# Patient Record
Sex: MALE | Race: WHITE | Employment: FULL TIME | URBAN - METROPOLITAN AREA
[De-identification: names, ages, dates, MRNs, and addresses within clinical notes are randomized per-mention and may not be internally consistent; named-entity substitution may affect disease eponyms.]

---

## 2018-06-15 ENCOUNTER — OFFICE VISIT (OUTPATIENT)
Dept: ORTHOPEDIC SURGERY | Age: 63
End: 2018-06-15

## 2018-06-15 VITALS
SYSTOLIC BLOOD PRESSURE: 131 MMHG | DIASTOLIC BLOOD PRESSURE: 84 MMHG | HEART RATE: 60 BPM | HEIGHT: 74 IN | WEIGHT: 245 LBS | BODY MASS INDEX: 31.44 KG/M2

## 2018-06-15 DIAGNOSIS — S83.242A TEAR OF MEDIAL MENISCUS OF LEFT KNEE, CURRENT, UNSPECIFIED TEAR TYPE, INITIAL ENCOUNTER: Primary | ICD-10-CM

## 2018-06-15 PROCEDURE — 99203 OFFICE O/P NEW LOW 30 MIN: CPT | Performed by: ORTHOPAEDIC SURGERY

## 2018-06-15 PROCEDURE — G8427 DOCREV CUR MEDS BY ELIG CLIN: HCPCS | Performed by: ORTHOPAEDIC SURGERY

## 2018-06-15 PROCEDURE — 1036F TOBACCO NON-USER: CPT | Performed by: ORTHOPAEDIC SURGERY

## 2018-06-15 PROCEDURE — 3017F COLORECTAL CA SCREEN DOC REV: CPT | Performed by: ORTHOPAEDIC SURGERY

## 2018-06-15 PROCEDURE — G8417 CALC BMI ABV UP PARAM F/U: HCPCS | Performed by: ORTHOPAEDIC SURGERY

## 2018-06-19 ENCOUNTER — HOSPITAL ENCOUNTER (OUTPATIENT)
Dept: MRI IMAGING | Age: 63
Discharge: HOME | End: 2018-06-19
Attending: ORTHOPAEDIC SURGERY | Admitting: ORTHOPAEDIC SURGERY

## 2018-06-19 DIAGNOSIS — S83.242A TEAR OF MEDIAL MENISCUS OF LEFT KNEE, CURRENT, UNSPECIFIED TEAR TYPE, INITIAL ENCOUNTER: ICD-10-CM

## 2018-06-19 DIAGNOSIS — S83.242A OTHER TEAR OF MEDIAL MENISCUS, CURRENT INJURY, LEFT KNEE, INITIAL ENCOUNTER: ICD-10-CM

## 2018-09-05 ENCOUNTER — TELEPHONE (OUTPATIENT)
Dept: ORTHOPEDIC SURGERY | Age: 63
End: 2018-09-05

## 2018-09-05 NOTE — TELEPHONE ENCOUNTER
Auth: # [id number]    Date: 9/25/18   Type of SX:  Outpatient  Location: [facility]  CPT: 40285, 21438   SX area:  knee

## 2018-09-25 ENCOUNTER — HOSPITAL ENCOUNTER (OUTPATIENT)
Age: 63
Setting detail: OUTPATIENT SURGERY
Discharge: HOME | End: 2018-09-25
Attending: ORTHOPAEDIC SURGERY | Admitting: ORTHOPAEDIC SURGERY
Payer: COMMERCIAL

## 2018-09-25 ENCOUNTER — ANESTHESIA EVENT (OUTPATIENT)
Dept: OPERATING ROOM | Age: 63
End: 2018-09-25
Payer: COMMERCIAL

## 2018-09-25 ENCOUNTER — ANESTHESIA (OUTPATIENT)
Dept: OPERATING ROOM | Age: 63
End: 2018-09-25
Payer: COMMERCIAL

## 2018-09-25 VITALS
OXYGEN SATURATION: 94 % | RESPIRATION RATE: 19 BRPM | HEART RATE: 60 BPM | WEIGHT: 239.2 LBS | BODY MASS INDEX: 30.7 KG/M2 | SYSTOLIC BLOOD PRESSURE: 120 MMHG | DIASTOLIC BLOOD PRESSURE: 72 MMHG | TEMPERATURE: 98.4 F | HEIGHT: 74 IN

## 2018-09-25 VITALS
RESPIRATION RATE: 1 BRPM | DIASTOLIC BLOOD PRESSURE: 58 MMHG | OXYGEN SATURATION: 98 % | TEMPERATURE: 97 F | SYSTOLIC BLOOD PRESSURE: 108 MMHG

## 2018-09-25 PROCEDURE — 6370000000 HC RX 637 (ALT 250 FOR IP): Performed by: ANESTHESIOLOGY

## 2018-09-25 PROCEDURE — 6360000002 HC RX W HCPCS: Performed by: ORTHOPAEDIC SURGERY

## 2018-09-25 PROCEDURE — 6360000002 HC RX W HCPCS: Performed by: NURSE ANESTHETIST, CERTIFIED REGISTERED

## 2018-09-25 PROCEDURE — 3700000001 HC ADD 15 MINUTES (ANESTHESIA): Performed by: ORTHOPAEDIC SURGERY

## 2018-09-25 PROCEDURE — 3700000000 HC ANESTHESIA ATTENDED CARE: Performed by: ORTHOPAEDIC SURGERY

## 2018-09-25 PROCEDURE — 2720000010 HC SURG SUPPLY STERILE: Performed by: ORTHOPAEDIC SURGERY

## 2018-09-25 PROCEDURE — 7100000001 HC PACU RECOVERY - ADDTL 15 MIN: Performed by: ORTHOPAEDIC SURGERY

## 2018-09-25 PROCEDURE — 3600000014 HC SURGERY LEVEL 4 ADDTL 15MIN: Performed by: ORTHOPAEDIC SURGERY

## 2018-09-25 PROCEDURE — 7100000000 HC PACU RECOVERY - FIRST 15 MIN: Performed by: ORTHOPAEDIC SURGERY

## 2018-09-25 PROCEDURE — 3600000004 HC SURGERY LEVEL 4 BASE: Performed by: ORTHOPAEDIC SURGERY

## 2018-09-25 PROCEDURE — 2580000003 HC RX 258: Performed by: NURSE ANESTHETIST, CERTIFIED REGISTERED

## 2018-09-25 PROCEDURE — 2580000003 HC RX 258: Performed by: ORTHOPAEDIC SURGERY

## 2018-09-25 PROCEDURE — 2500000003 HC RX 250 WO HCPCS: Performed by: ORTHOPAEDIC SURGERY

## 2018-09-25 PROCEDURE — 6360000002 HC RX W HCPCS: Performed by: ANESTHESIOLOGY

## 2018-09-25 PROCEDURE — 2709999900 HC NON-CHARGEABLE SUPPLY: Performed by: ORTHOPAEDIC SURGERY

## 2018-09-25 PROCEDURE — 7100000011 HC PHASE II RECOVERY - ADDTL 15 MIN: Performed by: ORTHOPAEDIC SURGERY

## 2018-09-25 PROCEDURE — 2500000003 HC RX 250 WO HCPCS: Performed by: NURSE ANESTHETIST, CERTIFIED REGISTERED

## 2018-09-25 PROCEDURE — 7100000010 HC PHASE II RECOVERY - FIRST 15 MIN: Performed by: ORTHOPAEDIC SURGERY

## 2018-09-25 RX ORDER — SODIUM CHLORIDE, SODIUM LACTATE, POTASSIUM CHLORIDE, CALCIUM CHLORIDE 600; 310; 30; 20 MG/100ML; MG/100ML; MG/100ML; MG/100ML
INJECTION, SOLUTION INTRAVENOUS CONTINUOUS
Status: DISCONTINUED | OUTPATIENT
Start: 2018-09-25 | End: 2018-09-25 | Stop reason: HOSPADM

## 2018-09-25 RX ORDER — LIDOCAINE HYDROCHLORIDE 20 MG/ML
INJECTION, SOLUTION INFILTRATION; PERINEURAL PRN
Status: DISCONTINUED | OUTPATIENT
Start: 2018-09-25 | End: 2018-09-25 | Stop reason: SDUPTHER

## 2018-09-25 RX ORDER — MAGNESIUM HYDROXIDE 1200 MG/15ML
LIQUID ORAL CONTINUOUS PRN
Status: DISCONTINUED | OUTPATIENT
Start: 2018-09-25 | End: 2018-09-25 | Stop reason: HOSPADM

## 2018-09-25 RX ORDER — MEPERIDINE HYDROCHLORIDE 25 MG/ML
12.5 INJECTION INTRAMUSCULAR; INTRAVENOUS; SUBCUTANEOUS EVERY 5 MIN PRN
Status: DISCONTINUED | OUTPATIENT
Start: 2018-09-25 | End: 2018-09-25 | Stop reason: HOSPADM

## 2018-09-25 RX ORDER — PROPOFOL 10 MG/ML
INJECTION, EMULSION INTRAVENOUS PRN
Status: DISCONTINUED | OUTPATIENT
Start: 2018-09-25 | End: 2018-09-25 | Stop reason: SDUPTHER

## 2018-09-25 RX ORDER — LABETALOL HYDROCHLORIDE 5 MG/ML
5 INJECTION, SOLUTION INTRAVENOUS EVERY 10 MIN PRN
Status: DISCONTINUED | OUTPATIENT
Start: 2018-09-25 | End: 2018-09-25 | Stop reason: HOSPADM

## 2018-09-25 RX ORDER — MORPHINE SULFATE 10 MG/ML
1 INJECTION, SOLUTION INTRAMUSCULAR; INTRAVENOUS EVERY 5 MIN PRN
Status: DISCONTINUED | OUTPATIENT
Start: 2018-09-25 | End: 2018-09-25 | Stop reason: HOSPADM

## 2018-09-25 RX ORDER — ONDANSETRON 2 MG/ML
4 INJECTION INTRAMUSCULAR; INTRAVENOUS
Status: DISCONTINUED | OUTPATIENT
Start: 2018-09-25 | End: 2018-09-25 | Stop reason: HOSPADM

## 2018-09-25 RX ORDER — DIPHENHYDRAMINE HYDROCHLORIDE 50 MG/ML
12.5 INJECTION INTRAMUSCULAR; INTRAVENOUS
Status: DISCONTINUED | OUTPATIENT
Start: 2018-09-25 | End: 2018-09-25 | Stop reason: HOSPADM

## 2018-09-25 RX ORDER — FENTANYL CITRATE 50 UG/ML
25 INJECTION, SOLUTION INTRAMUSCULAR; INTRAVENOUS EVERY 5 MIN PRN
Status: DISCONTINUED | OUTPATIENT
Start: 2018-09-25 | End: 2018-09-25 | Stop reason: HOSPADM

## 2018-09-25 RX ORDER — DEXAMETHASONE SODIUM PHOSPHATE 4 MG/ML
INJECTION, SOLUTION INTRA-ARTICULAR; INTRALESIONAL; INTRAMUSCULAR; INTRAVENOUS; SOFT TISSUE PRN
Status: DISCONTINUED | OUTPATIENT
Start: 2018-09-25 | End: 2018-09-25 | Stop reason: SDUPTHER

## 2018-09-25 RX ORDER — LIDOCAINE HYDROCHLORIDE 10 MG/ML
0.5 INJECTION, SOLUTION EPIDURAL; INFILTRATION; INTRACAUDAL; PERINEURAL ONCE
Status: COMPLETED | OUTPATIENT
Start: 2018-09-25 | End: 2018-09-25

## 2018-09-25 RX ORDER — HYDROMORPHONE HCL 110MG/55ML
0.5 PATIENT CONTROLLED ANALGESIA SYRINGE INTRAVENOUS EVERY 5 MIN PRN
Status: DISCONTINUED | OUTPATIENT
Start: 2018-09-25 | End: 2018-09-25 | Stop reason: HOSPADM

## 2018-09-25 RX ORDER — SODIUM CHLORIDE, SODIUM LACTATE, POTASSIUM CHLORIDE, CALCIUM CHLORIDE 600; 310; 30; 20 MG/100ML; MG/100ML; MG/100ML; MG/100ML
INJECTION, SOLUTION INTRAVENOUS CONTINUOUS PRN
Status: DISCONTINUED | OUTPATIENT
Start: 2018-09-25 | End: 2018-09-25 | Stop reason: SDUPTHER

## 2018-09-25 RX ORDER — ROPIVACAINE HYDROCHLORIDE 5 MG/ML
INJECTION, SOLUTION EPIDURAL; INFILTRATION; PERINEURAL
Status: COMPLETED | OUTPATIENT
Start: 2018-09-25 | End: 2018-09-25

## 2018-09-25 RX ORDER — CEFAZOLIN SODIUM 2 G/100ML
2 INJECTION, SOLUTION INTRAVENOUS
Status: COMPLETED | OUTPATIENT
Start: 2018-09-25 | End: 2018-09-25

## 2018-09-25 RX ORDER — ONDANSETRON 2 MG/ML
INJECTION INTRAMUSCULAR; INTRAVENOUS PRN
Status: DISCONTINUED | OUTPATIENT
Start: 2018-09-25 | End: 2018-09-25 | Stop reason: SDUPTHER

## 2018-09-25 RX ORDER — FENTANYL CITRATE 50 UG/ML
INJECTION, SOLUTION INTRAMUSCULAR; INTRAVENOUS PRN
Status: DISCONTINUED | OUTPATIENT
Start: 2018-09-25 | End: 2018-09-25 | Stop reason: SDUPTHER

## 2018-09-25 RX ORDER — DEXTROSE MONOHYDRATE 50 MG/ML
INJECTION, SOLUTION INTRAVENOUS CONTINUOUS PRN
Status: DISCONTINUED | OUTPATIENT
Start: 2018-09-25 | End: 2018-09-25 | Stop reason: HOSPADM

## 2018-09-25 RX ORDER — OXYCODONE HYDROCHLORIDE AND ACETAMINOPHEN 5; 325 MG/1; MG/1
2 TABLET ORAL PRN
Status: COMPLETED | OUTPATIENT
Start: 2018-09-25 | End: 2018-09-25

## 2018-09-25 RX ORDER — MORPHINE SULFATE 10 MG/ML
INJECTION, SOLUTION INTRAMUSCULAR; INTRAVENOUS
Status: COMPLETED | OUTPATIENT
Start: 2018-09-25 | End: 2018-09-25

## 2018-09-25 RX ORDER — OXYCODONE HYDROCHLORIDE AND ACETAMINOPHEN 5; 325 MG/1; MG/1
1 TABLET ORAL PRN
Status: COMPLETED | OUTPATIENT
Start: 2018-09-25 | End: 2018-09-25

## 2018-09-25 RX ADMIN — SODIUM CHLORIDE, POTASSIUM CHLORIDE, SODIUM LACTATE AND CALCIUM CHLORIDE: 600; 310; 30; 20 INJECTION, SOLUTION INTRAVENOUS at 11:57

## 2018-09-25 RX ADMIN — SODIUM CHLORIDE, POTASSIUM CHLORIDE, SODIUM LACTATE AND CALCIUM CHLORIDE: 600; 310; 30; 20 INJECTION, SOLUTION INTRAVENOUS at 12:00

## 2018-09-25 RX ADMIN — PROPOFOL 150 MG: 10 INJECTION, EMULSION INTRAVENOUS at 12:02

## 2018-09-25 RX ADMIN — CEFAZOLIN SODIUM 2 G: 2 INJECTION, SOLUTION INTRAVENOUS at 11:52

## 2018-09-25 RX ADMIN — SODIUM CHLORIDE, POTASSIUM CHLORIDE, SODIUM LACTATE AND CALCIUM CHLORIDE: 600; 310; 30; 20 INJECTION, SOLUTION INTRAVENOUS at 12:44

## 2018-09-25 RX ADMIN — OXYCODONE HYDROCHLORIDE AND ACETAMINOPHEN 1 TABLET: 5; 325 TABLET ORAL at 14:06

## 2018-09-25 RX ADMIN — FENTANYL CITRATE 50 MCG: 50 INJECTION, SOLUTION INTRAMUSCULAR; INTRAVENOUS at 12:19

## 2018-09-25 RX ADMIN — LIDOCAINE HYDROCHLORIDE 100 MG: 20 INJECTION, SOLUTION INFILTRATION; PERINEURAL at 12:00

## 2018-09-25 RX ADMIN — FENTANYL CITRATE 100 MCG: 50 INJECTION, SOLUTION INTRAMUSCULAR; INTRAVENOUS at 12:00

## 2018-09-25 RX ADMIN — DEXAMETHASONE SODIUM PHOSPHATE 8 MG: 4 INJECTION, SOLUTION INTRAMUSCULAR; INTRAVENOUS at 12:21

## 2018-09-25 RX ADMIN — HYDROMORPHONE HYDROCHLORIDE 0.5 MG: 2 INJECTION INTRAMUSCULAR; INTRAVENOUS; SUBCUTANEOUS at 13:47

## 2018-09-25 RX ADMIN — FENTANYL CITRATE 100 MCG: 50 INJECTION, SOLUTION INTRAMUSCULAR; INTRAVENOUS at 13:24

## 2018-09-25 RX ADMIN — SODIUM CHLORIDE, POTASSIUM CHLORIDE, SODIUM LACTATE AND CALCIUM CHLORIDE: 600; 310; 30; 20 INJECTION, SOLUTION INTRAVENOUS at 11:33

## 2018-09-25 RX ADMIN — LIDOCAINE HYDROCHLORIDE 0.2 ML: 10 INJECTION, SOLUTION EPIDURAL; INFILTRATION; INTRACAUDAL; PERINEURAL at 11:33

## 2018-09-25 RX ADMIN — ONDANSETRON 4 MG: 2 INJECTION INTRAMUSCULAR; INTRAVENOUS at 12:21

## 2018-09-25 ASSESSMENT — PAIN DESCRIPTION - LOCATION
LOCATION: KNEE

## 2018-09-25 ASSESSMENT — PULMONARY FUNCTION TESTS
PIF_VALUE: 2
PIF_VALUE: 3
PIF_VALUE: 3
PIF_VALUE: 1
PIF_VALUE: 2
PIF_VALUE: 16
PIF_VALUE: 2
PIF_VALUE: 3
PIF_VALUE: 2
PIF_VALUE: 3
PIF_VALUE: 2
PIF_VALUE: 2
PIF_VALUE: 4
PIF_VALUE: 1
PIF_VALUE: 15
PIF_VALUE: 2
PIF_VALUE: 3
PIF_VALUE: 3
PIF_VALUE: 16
PIF_VALUE: 2
PIF_VALUE: 3
PIF_VALUE: 2
PIF_VALUE: 2
PIF_VALUE: 3
PIF_VALUE: 5
PIF_VALUE: 3
PIF_VALUE: 2
PIF_VALUE: 0
PIF_VALUE: 3
PIF_VALUE: 2
PIF_VALUE: 3
PIF_VALUE: 2
PIF_VALUE: 0
PIF_VALUE: 3
PIF_VALUE: 2
PIF_VALUE: 2
PIF_VALUE: 3
PIF_VALUE: 2
PIF_VALUE: 2
PIF_VALUE: 3
PIF_VALUE: 3
PIF_VALUE: 2
PIF_VALUE: 2
PIF_VALUE: 1
PIF_VALUE: 17
PIF_VALUE: 2
PIF_VALUE: 3
PIF_VALUE: 2
PIF_VALUE: 5
PIF_VALUE: 3
PIF_VALUE: 3
PIF_VALUE: 2
PIF_VALUE: 2
PIF_VALUE: 16
PIF_VALUE: 2
PIF_VALUE: 16
PIF_VALUE: 2
PIF_VALUE: 3
PIF_VALUE: 2
PIF_VALUE: 1
PIF_VALUE: 2
PIF_VALUE: 3
PIF_VALUE: 3
PIF_VALUE: 0
PIF_VALUE: 1
PIF_VALUE: 16
PIF_VALUE: 16
PIF_VALUE: 3
PIF_VALUE: 9
PIF_VALUE: 3

## 2018-09-25 ASSESSMENT — PAIN DESCRIPTION - ORIENTATION
ORIENTATION: LEFT

## 2018-09-25 ASSESSMENT — ENCOUNTER SYMPTOMS: SHORTNESS OF BREATH: 0

## 2018-09-25 ASSESSMENT — PAIN SCALES - GENERAL
PAINLEVEL_OUTOF10: 8
PAINLEVEL_OUTOF10: 6
PAINLEVEL_OUTOF10: 8
PAINLEVEL_OUTOF10: 4
PAINLEVEL_OUTOF10: 6

## 2018-09-25 ASSESSMENT — PAIN DESCRIPTION - DESCRIPTORS: DESCRIPTORS: ACHING;CONSTANT;THROBBING

## 2018-09-25 ASSESSMENT — PAIN DESCRIPTION - PAIN TYPE
TYPE: SURGICAL PAIN
TYPE: SURGICAL PAIN

## 2018-09-25 ASSESSMENT — PAIN - FUNCTIONAL ASSESSMENT: PAIN_FUNCTIONAL_ASSESSMENT: 0-10

## 2018-09-25 NOTE — ANESTHESIA PRE PROCEDURE
Department of Anesthesiology  Preprocedure Note       Name:  Theresa Recinos   Age:  60 y.o.  :  1955                                          MRN:  [id number]         Date:  2018      Surgeon: Ck Barroso):  Xochilt Ricketts MD    Procedure: Procedure(s):  LEFT KNEE ARTHROSCOPE, MENISCAL EXCISION/REPAIR    Medications prior to admission:   Prior to Admission medications    Medication Sig Start Date End Date Taking? Authorizing Provider   HYDROcodone-acetaminophen (NORCO) 5-325 MG per tablet Take 2 tablets by mouth every 6 hours as needed for Pain (may take only one tablet if pain is mild) for up to 7 days. Medically necessary. 9/24/18 10/1/18  Shayy Levine MD   albuterol sulfate  (90 Base) MCG/ACT inhaler Inhale 2 puffs into the lungs every 6 hours as needed for Wheezing    Historical Provider, MD   diclofenac (VOLTAREN) 75 MG EC tablet Take 1 tablet by mouth 2 times daily 1 tablet by mouth twice a day 18   Xochilt Ricketts MD   Cetirizine HCl (ZYRTEC ALLERGY) 10 MG CAPS Take by mouth daily as needed     Historical Provider, MD   azelastine (OPTIVAR) 0.05 % ophthalmic solution Place 1 drop into both eyes daily as needed     Historical Provider, MD   Triamcinolone Acetonide (NASACORT ALLERGY 24HR) 55 MCG/ACT AERO 2 sprays by Nasal route daily as needed     Historical Provider, MD   ibuprofen (ADVIL;MOTRIN) 200 MG tablet Take 200 mg by mouth every 6 hours as needed for Pain. Historical Provider, MD       Current medications:    Current Facility-Administered Medications   Medication Dose Route Frequency Provider Last Rate Last Dose    ceFAZolin (ANCEF) 2 g in dextrose 4 % 100 mL IVPB (premix)  2 g Intravenous On Call to 412 ROXIE Lucero MD        lactated ringers infusion   Intravenous Continuous Xochilt Ricketts MD        lidocaine PF 1 % injection 0.5 mL  0.5 mL Intradermal Once Xochilt Ricketts MD           Allergies:     Allergies   Allergen Reactions    Dye [Gadolinium Derivatives]        Problem List:  There is no problem list on file for this patient. Past Medical History:        Diagnosis Date    Anemia     CHRONIC, MILD    Environmental allergies     Hyperlipidemia     LEMUEL (obstructive sleep apnea)        Past Surgical History:        Procedure Laterality Date    HERNIA REPAIR      JOINT REPLACEMENT Bilateral     HIP    NASAL SEPTUM SURGERY         Social History:    Social History   Substance Use Topics    Smoking status: Former Smoker     Packs/day: 1.00     Years: 15.00     Types: Cigarettes     Quit date: 4/3/1989    Smokeless tobacco: Never Used    Alcohol use No                                Counseling given: Not Answered      Vital Signs (Current):   Vitals:    09/25/18 1052   Weight: 239 lb 3.2 oz (108.5 kg)   Height: 6' 2\" (1.88 m)                                              BP Readings from Last 3 Encounters:   09/24/18 118/71   08/06/18 121/75   06/25/18 130/76       NPO Status:                                                                                 BMI:   Wt Readings from Last 3 Encounters:   09/25/18 239 lb 3.2 oz (108.5 kg)   09/24/18 245 lb (111.1 kg)   09/20/18 246 lb (111.6 kg)     Body mass index is 30.71 kg/m². CBC: No results found for: WBC, RBC, HGB, HCT, MCV, RDW, PLT    CMP: No results found for: NA, K, CL, CO2, BUN, CREATININE, GFRAA, AGRATIO, LABGLOM, GLUCOSE, PROT, CALCIUM, BILITOT, ALKPHOS, AST, ALT    POC Tests: No results for input(s): POCGLU, POCNA, POCK, POCCL, POCBUN, POCHEMO, POCHCT in the last 72 hours.     Coags: No results found for: PROTIME, INR, APTT    HCG (If Applicable): No results found for: PREGTESTUR, PREGSERUM, HCG, HCGQUANT     ABGs: No results found for: PHART, PO2ART, NCL5CQW, BNL2ULR, BEART, Y2DNBYSI     Type & Screen (If Applicable):  No results found for: LABABO, [address]    Anesthesia Evaluation  Patient summary reviewed and Nursing notes reviewed no history of anesthetic

## 2018-09-25 NOTE — PROGRESS NOTES
Adm to pacu from or per cart awakening. Respirations easy & symmetrical. Left leg elevated on pillow with ace wrap dry & intact. Lt pedal pulse strongly palpable. Lt toes warm & pink with brisk cap refill. Good movement & sensation. C/O slight numbness. Ice applied.

## 2018-09-25 NOTE — ANESTHESIA POSTPROCEDURE EVALUATION
Department of Anesthesiology  Postprocedure Note    Patient: Kaitlynn Abel  MRN: [id number]  YOB: 1955  Date of evaluation: 9/25/2018  Time:  2:47 PM     Procedure Summary     Date:  09/25/18 Room / Location:  [facility] ASC OR [address] [place] [address] ASC OR    Anesthesia Start:  1158 Anesthesia Stop:  0290    Procedure:  LEFT KNEE ARTHROSCOPE, MENISCAL EXCISION/REPAIR (Left ) Diagnosis:  (G60.763Y LEFT KNEE LATERAL MENISCUS TEAR)    Surgeon:  Alla Blakely MD Responsible Provider:  Isi Gloria MD    Anesthesia Type:  general ASA Status:  2          Anesthesia Type: general    Kodak Phase I: Kodak Score: 10    Kodak Phase II: Kodak Score: 10    Last vitals: Reviewed and per EMR flowsheets.        Anesthesia Post Evaluation    Level of consciousness: awake and alert  Complications: no  Hydration status: stable

## 2018-09-25 NOTE — PROGRESS NOTES
Awake alert & oriented. States pain is relieved to tolerable level. Ace wrap to left leg dry & intact. Good movement & sensation in toes. Patient discharged per wheelchair to the care of responsible party. No additional questions voiced related to discharge information. Patient discharged with all personal items.

## 2018-09-25 NOTE — ANESTHESIA POSTPROCEDURE EVALUATION
Department of Anesthesiology  Postprocedure Note    Patient: Marito Ramirez  MRN: [id number]  YOB: 1955  Date of evaluation: 9/25/2018  Time:  2:46 PM     Procedure Summary     Date:  09/25/18 Room / Location:  [facility] ASC OR [address] [place] [address] ASC OR    Anesthesia Start:  1158 Anesthesia Stop:  9485    Procedure:  LEFT KNEE ARTHROSCOPE, MENISCAL EXCISION/REPAIR (Left ) Diagnosis:  (O43.234V LEFT KNEE LATERAL MENISCUS TEAR)    Surgeon:  Ana María Kilgore MD Responsible Provider:  William Ojeda MD    Anesthesia Type:  general ASA Status:  2          Anesthesia Type: general    Kodak Phase I: Kodak Score: 10    Kodak Phase II: Kodak Score: 10    Last vitals: Reviewed and per EMR flowsheets.        Anesthesia Post Evaluation    Patient location during evaluation: PACU  Level of consciousness: awake and alert  Complications: no  Respiratory status: acceptable  Hydration status: stable

## 2018-09-26 ENCOUNTER — HOSPITAL ENCOUNTER (OUTPATIENT)
Dept: PHYSICAL THERAPY | Age: 63
Setting detail: THERAPIES SERIES
Discharge: HOME | End: 2018-09-26
Payer: COMMERCIAL

## 2018-09-26 PROCEDURE — 97110 THERAPEUTIC EXERCISES: CPT | Performed by: PHYSICAL THERAPIST

## 2018-09-26 PROCEDURE — 97161 PT EVAL LOW COMPLEX 20 MIN: CPT | Performed by: PHYSICAL THERAPIST

## 2018-09-26 PROCEDURE — 97016 VASOPNEUMATIC DEVICE THERAPY: CPT | Performed by: PHYSICAL THERAPIST

## 2018-09-26 PROCEDURE — G8979 MOBILITY GOAL STATUS: HCPCS | Performed by: PHYSICAL THERAPIST

## 2018-09-26 PROCEDURE — G8978 MOBILITY CURRENT STATUS: HCPCS | Performed by: PHYSICAL THERAPIST

## 2018-09-26 NOTE — OP NOTE
[place] [address]                      [facility], [address]                                 OPERATIVE REPORT    PATIENT NAME: Griselda Otto                         :        1955  MED REC NO:   [id number]                          ROOM:  ACCOUNT NO:   [de-identified]                           ADMIT DATE: 2018  PROVIDER:     Preeti Badillo MD    DATE OF PROCEDURE:  2018    PREOPERATIVE DIAGNOSIS:  Left knee lateral meniscus tear. POSTOPERATIVE DIAGNOSES:  Left knee osteoarthritis, notch impingement,  lateral meniscus tear. OPERATION PERFORMED:  Scope, synovectomy of suprapatellar pouch, medial and  lateral gutters, loose body removal, anvil lesion resection, partial  lateral meniscectomy. SURGEON:  Preeti Badillo MD    ASSISTANT:  _____. INDICATIONS:  This is a 58-year-old male with left knee lateral joint line  pain. He had relatively normal joint spaces on standing AP x-ray, but he  had lateral joint line pain and catching. Jose's testing did not  produce a clunk or shift, but increased pain. He did have a steroid  injection in the knee, which did not relieve all his symptoms. Ultimately,  he obtained an MRI which showed a lateral meniscus tear. So, after  discussion of risks, benefits and alternatives, the patient wished to  proceed with surgery. OPERATIVE PROCEDURE:  The patient was transported to the operating room. After general anesthesia was induced, a padded tourniquet was placed on the  thigh and the leg was prepped and draped in a sterile fashion. After  exsanguination with Esmarch bandage, the tourniquet was inflated to 275  mmHg. Routine arthroscopic portals were made. There were lot of villous  synovitis and a lot of cartilaginous loose bodies in the suprapatellar  pouch. The loose bodies were removed and synovectomy was performed _____  both gutters and the suprapatellar pouch area.   There was superficial  fibrillation of the patella and trochlea, but there were no deep tissues  and no loose cartilaginous flaps. Medial joint line was inspected. The  medial meniscus was visualized and probed from anterior to posterior horn  and this was unremarkable. The medial articular cartilage was in good  shape. The notch was inspected. There was a lot of synovitis in the notch and  this was removed. Anterior and posterior cruciate ligaments were intact. There was a thin rim of osteophytes around the notch and just anterior to  the base of the ACL there was a bony anvil lesion. Soft tissue ablated  over the top of this and then this bony ridge was removed so that in  extension this is no longer impinging on the top of the notch. The lateral joint line was inspected. It is mainly degenerative, but got  an undersurface oblique tear about a centimeter off the posterior horn. A  lot of this tissue was yellow and hard. Basket forceps were  used to trim  this back to stable base. There was a 3A lesion on the very posterior  condyle and attempt was made to do microfracture, but appropriate angle  could not be achieved on this and no microfracture was performed. Much of  the cartilage in the lateral femoral condyle was in good shape, but the  tear was probably 15 x 15 mm again on the posterior lateral femoral  condyle. The tibial surfaces were in relatively good shape. With this  completed, the instruments were removed. The portals were closed with 4-0  Monocryl and Steri-Strips. The knee was injected with ropivacaine and  Morphine. A sterile compressive wrap was applied. The patient tolerated  this procedure well and there were no known complications. He was returned  to the recovery area in stable condition.         Yordy Blair MD    D: 09/25/2018 13:18:05       T: 09/26/2018 1:15:59     [email or web address]  Job#: [id number]     Doc#: [id number]    CC:

## 2018-09-26 NOTE — FLOWSHEET NOTE
[facility], [place]    Physical Therapy Daily Treatment Note  Date:  2018    Patient Name:  Melyssa Domingo    :  1955  MRN: [id number]  Medical/Treatment Diagnosis Information:  Diagnosis: S83.282 (ICD-10-CM) - Tear of lateral meniscus of left knee, current, unspecified tear type; L knee plm dos: 18  Treatment Diagnosis: R26.2 difficulty walking  Insurance/Certification information:  PT Insurance Information: Lyudmila  Physician Information:  Referring Practitioner: Dr. Michelle Hernandez of care signed (Y/N):     Date of Patient follow up with Physician:     G-Code (if applicable):      Date G-Code Applied:    PT G-Codes  Functional Assessment Tool Used: LEFS  Score: 95% limitation  Functional Limitation: Mobility: Walking and moving around  Mobility: Walking and Moving Around Current Status (): At least 80 percent but less than 100 percent impaired, limited or restricted  Mobility: Walking and Moving Around Goal Status ():  At least 20 percent but less than 40 percent impaired, limited or restricted    Progress Note: [x]  Yes  []  No  Next due by: Visit #10       Latex Allergy:  [x]NO      []YES  Preferred Language for Healthcare:   [x]English       []other:    Visit # Insurance Allowable   1 MN     Pain level:  7/10     SUBJECTIVE:  See eval    OBJECTIVE: See eval    Girth (cm) Pre-vaso Post-vaso    L R L R   Mid-patellar 55 44.1 45.4 NT   Suprapatellar 48.5 46.5 46.9 NT       RESTRICTIONS/PRECAUTIONS: s/p L knee plm dos: 18     Hx of B CAMILLA    Exercises/Interventions:     Therapeutic Exercises  Resistance / level Sets/sec Reps Notes   Ankle pumps  1 30    Seated gastroc stretch - towel pull  30\" 5    Seated HS stretch  30\" 5    Heel slides - knee flexion PROM  10\" 7 Limited due to slight bleeding          SLR - flexion  2 10 Limited due to pain   SLR - abduction  3 10                                Neuromuscular Re-ed / Therapeutic

## 2018-09-26 NOTE — PLAN OF CARE
Medical History: 1 R CAMILLA (recently 2009), 2 L CAMILLA (most recently 2007), arthritis  Functional Disability Index:PT G-Codes  Functional Assessment Tool Used: LEFS  Score: 95% limitation  Functional Limitation: Mobility: Walking and moving around  Mobility: Walking and Moving Around Current Status (): At least 80 percent but less than 100 percent impaired, limited or restricted  Mobility: Walking and Moving Around Goal Status (): At least 20 percent but less than 40 percent impaired, limited or restricted    Pain Scale: 7/10  Easing factors: pain medication  Provocative factors: activity     Type: [x]Constant   []Intermittent  []Radiating [x]Localized []other:     Numbness/Tingling: denies    Occupation/School: tire  - lots of travel    Living Status: lives with wife, steps into basement    Prior Level of Function:Prior to this injury / incident, pt was independent with ADLs and IADLs, ambulation without AD, golf and swimming      OBJECTIVE:   Palpation: no point tenderness noted    Quad tone: fair    Functional Mobility/Transfers: independent but increased time required for management of L LE    Posture: guarded posture of L LE    Bandages/Dressings/Incisions:  Post-op dressings removed. Skin was cleaned with rubbing alcohol and gauze. Incisions clean and dry. No S&S of infection. Gait: (include devices/WB status) WBAT with standard walker, pt.  Into clinic TTWB with step to gait with walker    Dermatomes Normal Abnormal Comments   anterior mid-thigh (L2) x     distal ant thigh/med knee (L3) x     medial lower leg and foot (L4) x     lateral lower leg and foot (L5) x     posterior calf (S1) x     medial calcaneus (S2) x         Myotomes - NT due to recent surgery Normal Abnormal Comments   Hip flexion (L1-L2)      Knee extension (L2-L4)      Dorsiflexion (L4-L5)      Great Toe Ext (L5)      Ankle Eversion (S1-S2)      Ankle PF(S1-S2)          Reflexes - NT due to recent surgery Normal Abnormal Comments   S1-2 Seated achilles      S1-2 Prone knee bend      L3-4 Patellar tendon      Clonus      Babinski           PROM AROM - NT    L R L R   Knee Flexion 83 heel slide 144     Knee Extension 0 0         Strength (0-5) - NT due to recent surgery Left Right   Hip Flexion - supine     Hip Flexion - seated     Hip Abduction     Hip Adduction     Quads     Hamstrings          Flexibility - NT due to recent surgery     Hamstrings (90/90)     ITB (Agustin)     Quads (Ely's)     Hip Flexor (Bebeto)          Girth (cm)     Mid patella 46 44.1   Suprapatellar 48.5 46.5       Joint mobility: NT   []Normal    []Hypo   []Hyper    Orthopedic Special Tests:    Mikel's (-)     Balance: NT due to recent surgery                           [x] Patient history, allergies, meds reviewed. Medical chart reviewed. See intake form. Review Of Systems (ROS):  [x]Performed Review of systems (Integumentary, CardioPulmonary, Neurological) by intake and observation. Intake form has been scanned into medical record. Patient has been instructed to contact their primary care physician regarding ROS issues if not already being addressed at this time.       Co-morbidities/Complexities (which will affect course of rehabilitation):   []None           Arthritic conditions   []Rheumatoid arthritis (M05.9)  [x]Osteoarthritis (M19.91)   Cardiovascular conditions   []Hypertension (I10)  []Hyperlipidemia (E78.5)  []Angina pectoris (I20)  []Atherosclerosis (I70)   Musculoskeletal conditions   []Disc pathology   []Congenital spine pathologies   [x]Prior surgical intervention  []Osteoporosis (M81.8)  []Osteopenia (M85.8)   Endocrine conditions   []Hypothyroid (E03.9)  []Hyperthyroid Gastrointestinal conditions   []Constipation (G49.27)   Metabolic conditions   []Morbid obesity (E66.01)  []Diabetes type 1(E10.65) or 2 (E11.65)   []Neuropathy (G60.9)     Pulmonary conditions   []Asthma (J45)  []Coughing   []COPD (J44.9)   Psychological Disorders  []Anxiety (F41.9)  []Depression (F32.9)   []Other:   []Other:          Barriers to/and or personal factors that will affect rehab potential:              [x]Age  []Sex    []Smoker              []Motivation/Lack of Motivation                        [x]Co-Morbidities              []Cognitive Function, education/learning barriers              []Environmental, home barriers              [x]profession/work barriers  []past PT/medical experience  []other:  Justification:     Falls Risk Assessment (30 days):   [x] Falls Risk assessed and no intervention required. [] Falls Risk assessed and Patient requires intervention due to being higher risk   TUG score (>12s at risk):     [] Falls education provided, including       G-Codes:  PT G-Codes  Functional Assessment Tool Used: LEFS  Score: 95% limitation  Functional Limitation: Mobility: Walking and moving around  Mobility: Walking and Moving Around Current Status (): At least 80 percent but less than 100 percent impaired, limited or restricted  Mobility: Walking and Moving Around Goal Status ():  At least 20 percent but less than 40 percent impaired, limited or restricted    ASSESSMENT:   Functional Impairments:     []Noted lumbar/proximal hip/LE joint hypomobility   [x]Decreased LE functional ROM   [x]Decreased core/proximal hip strength and neuromuscular control   [x]Decreased LE functional strength   [x]Reduced balance/proprioceptive control   []other:      Functional Activity Limitations (from functional questionnaire and intake)   []Reduced ability to tolerate prolonged functional positions   [x]Reduced ability or difficulty with changes of positions or transfers between positions   [x]Reduced ability to maintain good posture and demonstrate good body mechanics with sitting, bending, and lifting   [x]Reduced ability to sleep   [x] Reduced ability or tolerance with driving and/or computer work   [x]Reduced ability to perform lifting, carrying tasks   [x]Reduced ability to Deficits. 3. Patient will demonstrate an increase in Strength to at least 4+/5 throughout as well as good proximal hip strength and control to allow for proper functional mobility as indicated by patients Functional Deficits. 4. Patient will return to functional activities including walking and lateral movements without increased symptoms or restriction. 5. Patient will return to swimming without increased symptoms or restriction.       Electronically signed by:  Talita Daniel PT

## 2018-09-28 ENCOUNTER — HOSPITAL ENCOUNTER (OUTPATIENT)
Dept: PHYSICAL THERAPY | Age: 63
Setting detail: THERAPIES SERIES
Discharge: HOME | End: 2018-09-28
Payer: COMMERCIAL

## 2018-09-28 PROCEDURE — 97110 THERAPEUTIC EXERCISES: CPT | Performed by: PHYSICAL THERAPIST

## 2018-09-28 PROCEDURE — 97016 VASOPNEUMATIC DEVICE THERAPY: CPT | Performed by: PHYSICAL THERAPIST

## 2018-09-28 NOTE — FLOWSHEET NOTE
Start 9/28                 Neuromuscular Re-ed / Therapeutic Activities       Quad sets  10\" 10                  Manual Intervention       Knee mobs/PROM       Tib/Fem Mobs       Patella Mobs       Ankle mobs                       Patient Education:  -reviewed HEP  -pt. Okay to use crutch/cane at home for balance  -pt. Requesting information to decrease constipation, talked to MD staff and relayed message to try OTC magnesium citrate    Therapeutic Exercise and NMR EXR  [x] (95982) Provided verbal/tactile cueing for activities related to strengthening, flexibility, endurance, ROM for improvements in LE, proximal hip, and core control with self care, mobility, lifting, ambulation.  [] (70080) Provided verbal/tactile cueing for activities related to improving balance, coordination, kinesthetic sense, posture, motor skill, proprioception  to assist with LE, proximal hip, and core control in self care, mobility, lifting, ambulation and eccentric single leg control.      NMR and Therapeutic Activities:    [x] (12698 or 32042) Provided verbal/tactile cueing for activities related to improving balance, coordination, kinesthetic sense, posture, motor skill, proprioception and motor activation to allow for proper function of core, proximal hip and LE with self care and ADLs  [] (01914) Gait Re-education- Provided training and instruction to the patient for proper LE, core and proximal hip recruitment and positioning and eccentric body weight control with ambulation re-education including up and down stairs     Home Exercise Program:    [x] (78357) Reviewed/Progressed HEP activities related to strengthening, flexibility, endurance, ROM of core, proximal hip and LE for functional self-care, mobility, lifting and ambulation/stair navigation   [] (25615)Reviewed/Progressed HEP activities related to improving balance, coordination, kinesthetic sense, posture, motor skill, proprioception of core, proximal hip and LE for self care, demonstrate an increase in Strength to at least 4+/5 throughout as well as good proximal hip strength and control to allow for proper functional mobility as indicated by patients Functional Deficits. 4. Patient will return to functional activities including walking and lateral movements without increased symptoms or restriction. 5. Patient will return to swimming without increased symptoms or restriction. Progression Towards Functional goals:  [] Patient is progressing as expected towards functional goals listed. [] Progression is slowed due to complexities listed. [] Progression has been slowed due to co-morbidities. [x] Plan just implemented, too soon to assess goals progression  [] Other:     Persisting Functional Limitations/Impairments:  []Sitting [x]Standing   [x]Walking [x]Squatting/bending    [x]Stairs [x]ADL's    []Transfers []Reaching  [x]Housework [x]Job related tasks  [x]Driving [x]Sports/Recreation   []Other:    ASSESSMENT:    Treatment/Activity Tolerance:  [x] Patient tolerated treatment well [] Patient limited by fatique  [] Patient limited by pain  [] Patient limited by other medical complications  [x] Other: Pt. Tolerated therapy today without complaints. Pt. Demonstrates significant improvement in knee mobility and quad activation. Pt. Continues to have hip weakness limiting SLR. Decreased hip pain with modification to semi-reclined flexion SLR. Initiated calf raises and standing HS curls without issue. Pt. Continues to have mild effusion addressed with vaso. Pt. Improving in tolerance for WB through L LE. Pt. Requires continued progression of post-op protocol in order to restore normalized gait and functional strength.      Prognosis: [x] Good [] Fair  [] Poor    Patient Requires Follow-up: [x] Yes  [] No    Return to Play:    [x]  N/A   []  Stage 1: Intro to Strength   []  Stage 2: Return to Run and Strength   []  Stage 3: Return to Jump and Strength   []  Stage 4: Dynamic Strength

## 2018-10-02 ENCOUNTER — HOSPITAL ENCOUNTER (OUTPATIENT)
Dept: PHYSICAL THERAPY | Age: 63
Setting detail: THERAPIES SERIES
Discharge: HOME | End: 2018-10-02
Payer: COMMERCIAL

## 2018-10-02 PROCEDURE — 97112 NEUROMUSCULAR REEDUCATION: CPT | Performed by: PHYSICAL THERAPIST

## 2018-10-02 PROCEDURE — 97110 THERAPEUTIC EXERCISES: CPT | Performed by: PHYSICAL THERAPIST

## 2018-10-02 NOTE — FLOWSHEET NOTE
[facility], [place]    Physical Therapy Daily Treatment Note  Date:  10/2/2018    Patient Name:  Jody Cartagena    :  1955  MRN: [id number]  Medical/Treatment Diagnosis Information:  Diagnosis: S83.282 (ICD-10-CM) - Tear of lateral meniscus of left knee, current, unspecified tear type; L knee plm dos: 18  Treatment Diagnosis: R26.2 difficulty walking  Insurance/Certification information:  PT Insurance Information: [facility]  Physician Information:  Referring Practitioner: Dr. Heidi Langford of care signed (Y/N): Y    Date of Patient follow up with Physician:     G-Code (if applicable):      Date G-Code Applied:         Progress Note: []  Yes  [x]  No  Next due by: Visit #10       Latex Allergy:  [x]NO      []YES  Preferred Language for Healthcare:   [x]English       []other:    Visit # Insurance Allowable   3 MN     Pain level:  2-5/10     SUBJECTIVE: Pt. Reports that his knee continues to be sore with increased activity. Pt. Notes that the majority of his pain is inferior to knee cap. Pt. Reports compliance with HEP, but feels that he may have \"overdone it\".     OBJECTIVE: 10/2    Incisions: steri-strips intact, negative for signs and symptoms of infection  Quad: fair+     PROM AROM - NT     L R L R   Knee Flexion 125        Knee Extension              Girth (cm) Pre-vaso Post-vaso    L R L R   Mid-patellar 44.5 43.5 NT   Suprapatellar 46.5 46.0 NT       RESTRICTIONS/PRECAUTIONS: s/p L knee plm dos: 18     Hx of B CAMILLA    Exercises/Interventions:     Therapeutic Exercises  Resistance / level Sets/sec Reps Notes   Seated gastroc stretch - towel pull  30\" 5    Seated HS stretch  30\" 5    Standing quad stretch stool 30\" 5 Start 10/2   Heel slides - knee flexion PROM  10\" 10           SLR - flexion; semi-reclined  3 10  modified to semi-reclined   SLR - abduction  3 10    Hip add sets BS 10\" 10 Start           Calf raises  3 10 Start  Standing HS curls 4# 3 10 ^10/2   LAQ (90-30) 4# 3 10 Start 10/2          Neuromuscular Re-ed / Therapeutic Activities       Quad sets  10\" 10    Gait training Without AD 3'  10/2   biodex balance PS L12 3'  Start 10/2                 Manual Intervention       Knee mobs/PROM       Tib/Fem Mobs       Patella Mobs       Ankle mobs                       Patient Education:  -reviewed HEP    Therapeutic Exercise and NMR EXR  [x] (86358) Provided verbal/tactile cueing for activities related to strengthening, flexibility, endurance, ROM for improvements in LE, proximal hip, and core control with self care, mobility, lifting, ambulation.  [] (63868) Provided verbal/tactile cueing for activities related to improving balance, coordination, kinesthetic sense, posture, motor skill, proprioception  to assist with LE, proximal hip, and core control in self care, mobility, lifting, ambulation and eccentric single leg control.      NMR and Therapeutic Activities:    [x] (27032 or 72808) Provided verbal/tactile cueing for activities related to improving balance, coordination, kinesthetic sense, posture, motor skill, proprioception and motor activation to allow for proper function of core, proximal hip and LE with self care and ADLs  [] (27596) Gait Re-education- Provided training and instruction to the patient for proper LE, core and proximal hip recruitment and positioning and eccentric body weight control with ambulation re-education including up and down stairs     Home Exercise Program:    [x] (91648) Reviewed/Progressed HEP activities related to strengthening, flexibility, endurance, ROM of core, proximal hip and LE for functional self-care, mobility, lifting and ambulation/stair navigation   [] (09189)Reviewed/Progressed HEP activities related to improving balance, coordination, kinesthetic sense, posture, motor skill, proprioception of core, proximal hip and LE for self care, mobility, lifting, and ambulation/stair navigation      Manual Treatments:  PROM / STM / Oscillations-Mobs:  G-I, II, III, IV (PA's, Inf., Post.)  [] (82752) Provided manual therapy to mobilize LE, proximal hip and/or LS spine soft tissue/joints for the purpose of modulating pain, promoting relaxation,  increasing ROM, reducing/eliminating soft tissue swelling/inflammation/restriction, improving soft tissue extensibility and allowing for proper ROM for normal function with self care, mobility, lifting and ambulation. Modalities:  [x] (27148) Vasopneumatic compression: Utilized vasopneumatic compression to decrease edema / swelling for the purpose of improving mobility and quad tone / recruitment which will allow for increased overall function including but not limited to self-care, transfers, ambulation, and ascending / descending stairs. Modalities:   Vaso - declined    Charges:  Timed Code Treatment Minutes: 40   Total Treatment Minutes: 46     [] EVAL - LOW (92608)   [] EVAL - MOD (16096)  [] EVAL - HIGH (91408)  [] RE-EVAL (89814)   [x] VN(52480) x  2   [] IONTO  [x] NMR (11859) x  1   [] VASO  [] Manual (71591) x       [] Other:  [] TA x       [] Mech Traction (03136)  [] ES(attended) (59225)      [] ES (un) (10852):     GOALS:  Patient stated goal: return to swimming and golf    Therapist goals for Patient:   Short Term Goals: To be achieved in: 2 weeks  1. Independent in HEP and progression per patient tolerance, in order to prevent re-injury. 2. Patient will have a decrease in pain to facilitate improvement in movement, function, and ADLs as indicated by Functional Deficits. Long Term Goals: To be achieved in: 8-10 weeks  1. Disability index score of 25% or less for the LEFS to assist with reaching prior level of function. 2. Patient will demonstrate increased AROM to equal to R without pain to allow for proper joint functioning as indicated by patients Functional Deficits.    3. Patient will demonstrate an increase in Strength to Stage 3: Return to Jump and Strength   []  Stage 4: Dynamic Strength and Agility   []  Stage 5: Sport Specific Training     []  Ready to Return to Play, Meets All Above Stages   []  Not Ready for Return to Sports   Comments:            PLAN: 1-2x/wk, suture removal NPV  [x] Continue per plan of care [] Alter current plan (see comments)  [] Plan of care initiated [] Hold pending MD visit [] Discharge    Electronically signed by: Kirti Metz PT, DPT

## 2018-10-05 ENCOUNTER — TELEPHONE (OUTPATIENT)
Dept: ORTHOPEDIC SURGERY | Age: 63
End: 2018-10-05

## 2018-10-05 ENCOUNTER — HOSPITAL ENCOUNTER (OUTPATIENT)
Dept: PHYSICAL THERAPY | Age: 63
Setting detail: THERAPIES SERIES
Discharge: HOME | End: 2018-10-05
Payer: COMMERCIAL

## 2018-10-05 PROCEDURE — 97110 THERAPEUTIC EXERCISES: CPT | Performed by: PHYSICAL THERAPIST

## 2018-10-05 PROCEDURE — 97112 NEUROMUSCULAR REEDUCATION: CPT | Performed by: PHYSICAL THERAPIST

## 2018-10-09 ENCOUNTER — HOSPITAL ENCOUNTER (OUTPATIENT)
Dept: PHYSICAL THERAPY | Age: 63
Setting detail: THERAPIES SERIES
Discharge: HOME | End: 2018-10-09
Payer: COMMERCIAL

## 2018-10-09 PROCEDURE — 97112 NEUROMUSCULAR REEDUCATION: CPT | Performed by: PHYSICAL THERAPIST

## 2018-10-09 PROCEDURE — 97110 THERAPEUTIC EXERCISES: CPT | Performed by: PHYSICAL THERAPIST

## 2018-10-09 NOTE — FLOWSHEET NOTE
increase in Strength to at least 4+/5 throughout as well as good proximal hip strength and control to allow for proper functional mobility as indicated by patients Functional Deficits. 4. Patient will return to functional activities including walking and lateral movements without increased symptoms or restriction. 5. Patient will return to swimming without increased symptoms or restriction. Progression Towards Functional goals:  [] Patient is progressing as expected towards functional goals listed. [] Progression is slowed due to complexities listed. [] Progression has been slowed due to co-morbidities. [x] Plan just implemented, too soon to assess goals progression  [] Other:     Persisting Functional Limitations/Impairments:  []Sitting [x]Standing   [x]Walking [x]Squatting/bending    [x]Stairs [x]ADL's    []Transfers []Reaching  [x]Housework [x]Job related tasks  [x]Driving [x]Sports/Recreation   []Other:    ASSESSMENT:    Treatment/Activity Tolerance:  [x] Patient tolerated treatment well [] Patient limited by fatique  [] Patient limited by pain  [] Patient limited by other medical complications  [x] Other: Pt. Tolerated therapy today with minimal complaints. Pt. Demonstrates improved quad control and knee flexion mobility. Pt. Continues to have slight antalgic gait. Limited in progression with SLR due to hip pain. Pt. Notes slight medial knee pain with addition of leg press and requires cueing to maintain knee alignment. Pt. Requires occasional UE support with biodex balance. Pt. Requires continued progression of post-op protocol in order to restore PLOF.      Prognosis: [x] Good [] Fair  [] Poor    Patient Requires Follow-up: [x] Yes  [] No    Return to Play:    [x]  N/A   []  Stage 1: Intro to Strength   []  Stage 2: Return to Run and Strength   []  Stage 3: Return to Jump and Strength   []  Stage 4: Dynamic Strength and Agility   []  Stage 5: Sport Specific Training     []  Ready to Return to

## 2018-10-11 ENCOUNTER — HOSPITAL ENCOUNTER (OUTPATIENT)
Dept: PHYSICAL THERAPY | Age: 63
Setting detail: THERAPIES SERIES
Discharge: HOME | End: 2018-10-11
Payer: COMMERCIAL

## 2018-10-11 PROCEDURE — 97110 THERAPEUTIC EXERCISES: CPT | Performed by: PHYSICAL THERAPIST

## 2018-10-11 NOTE — FLOWSHEET NOTE
[facility], [place]    Physical Therapy Daily Treatment Note  Date:  10/11/2018    Patient Name:  Leona Felton    :  1955  MRN: [id number]  Medical/Treatment Diagnosis Information:  Diagnosis: S83.282 (ICD-10-CM) - Tear of lateral meniscus of left knee, current, unspecified tear type; L knee plm dos: 18  Treatment Diagnosis: R26.2 difficulty walking  Insurance/Certification information:  PT Insurance Information: [facility]  Physician Information:  Referring Practitioner: Dr. Ervin Cm of care signed (Y/N): Y    Date of Patient follow up with Physician:     G-Code (if applicable):      Date G-Code Applied:         Progress Note: []  Yes  [x]  No  Next due by: Visit #10       Latex Allergy:  [x]NO      []YES  Preferred Language for Healthcare:   [x]English       []other:    Visit # Insurance Allowable   6 MN     Pain level:  2/10     SUBJECTIVE: Pt. Reports that his knee is feeling better. He continues to have some discomfort and aching in L knee. Pt. Reports compliance with HEP 1x/day.      OBJECTIVE: 10/11    Incisions: closed and healing, negative for signs & sxs of infection  Quad: fair+      PROM AROM - NT     L R L R   Knee Flexion 132        Knee Extension              Girth (cm) Pre-vaso Post-vaso    L R L R   Mid-patellar NT   Suprapatellar NT       RESTRICTIONS/PRECAUTIONS: s/p L knee plm dos: 18     Hx of B CAMILLA    Exercises/Interventions:     Therapeutic Exercises  Resistance / level Sets/sec Reps Notes   Incline board calf stretch  30\" 5    Seated HS stretch  30\" 5    Standing quad stretch stool 30\" 5 Start 10/2   Heel slides - knee flexion PROM  10\" 10           SLR - flexion; semi-reclined  3 10  modified to semi-reclined   SLR - abduction ^NPV 3 10    Hip add sets BS 10\" 10 Start           Calf raises  3 10 Start    Standing HS curls 7.5# 3 10 ^10/11   LAQ (90-30) 7.5# 3 10 ^10/11   Leg press DL 60# 3 10 Start 10/9 Oscillations-Mobs:  G-I, II, III, IV (PA's, Inf., Post.)  [] (59381) Provided manual therapy to mobilize LE, proximal hip and/or LS spine soft tissue/joints for the purpose of modulating pain, promoting relaxation,  increasing ROM, reducing/eliminating soft tissue swelling/inflammation/restriction, improving soft tissue extensibility and allowing for proper ROM for normal function with self care, mobility, lifting and ambulation. Modalities:  [x] (67886) Vasopneumatic compression: Utilized vasopneumatic compression to decrease edema / swelling for the purpose of improving mobility and quad tone / recruitment which will allow for increased overall function including but not limited to self-care, transfers, ambulation, and ascending / descending stairs. Modalities:   Vaso - declined    Charges:  Timed Code Treatment Minutes: 40   Total Treatment Minutes: 46     [] EVAL - LOW (37186)   [] EVAL - MOD (29274)  [] EVAL - HIGH (58352)  [] RE-EVAL (76797)   [x] CRISTINO(82725) x  3   [] IONTO  [] NMR (62997) x      [] VASO  [] Manual (29910) x       [] Other:  [] TA x       [] Mech Traction (46392)  [] ES(attended) (99826)      [] ES (un) (13187):     GOALS:  Patient stated goal: return to swimming and golf    Therapist goals for Patient:   Short Term Goals: To be achieved in: 2 weeks  1. Independent in HEP and progression per patient tolerance, in order to prevent re-injury. 2. Patient will have a decrease in pain to facilitate improvement in movement, function, and ADLs as indicated by Functional Deficits. Long Term Goals: To be achieved in: 8-10 weeks  1. Disability index score of 25% or less for the LEFS to assist with reaching prior level of function. 2. Patient will demonstrate increased AROM to equal to R without pain to allow for proper joint functioning as indicated by patients Functional Deficits.    3. Patient will demonstrate an increase in Strength to at least 4+/5 throughout as well as good proximal

## 2018-10-19 ENCOUNTER — HOSPITAL ENCOUNTER (OUTPATIENT)
Dept: PHYSICAL THERAPY | Age: 63
Setting detail: THERAPIES SERIES
Discharge: HOME | End: 2018-10-19
Payer: COMMERCIAL

## 2018-10-19 PROCEDURE — 97110 THERAPEUTIC EXERCISES: CPT | Performed by: PHYSICAL THERAPIST

## 2018-10-19 NOTE — FLOWSHEET NOTE
kinesthetic sense, posture, motor skill, proprioception of core, proximal hip and LE for self care, mobility, lifting, and ambulation/stair navigation      Manual Treatments:  PROM / STM / Oscillations-Mobs:  G-I, II, III, IV (PA's, Inf., Post.)  [] (34627) Provided manual therapy to mobilize LE, proximal hip and/or LS spine soft tissue/joints for the purpose of modulating pain, promoting relaxation,  increasing ROM, reducing/eliminating soft tissue swelling/inflammation/restriction, improving soft tissue extensibility and allowing for proper ROM for normal function with self care, mobility, lifting and ambulation. Modalities:  [x] (51544) Vasopneumatic compression: Utilized vasopneumatic compression to decrease edema / swelling for the purpose of improving mobility and quad tone / recruitment which will allow for increased overall function including but not limited to self-care, transfers, ambulation, and ascending / descending stairs. Modalities:   Vaso - declined    Charges:  Timed Code Treatment Minutes: 42   Total Treatment Minutes: 46     [] EVAL - LOW (28812)   [] EVAL - MOD (29880)  [] EVAL - HIGH (23113)  [] RE-EVAL (99430)   [x] KR(63150) x  3   [] IONTO   [] NMR (83417) x      [] VASO  [] Manual (20586) x       [] Other:  [] TA x       [] Mech Traction (49760)  [] ES(attended) (85467)      [] ES (un) (36722):     GOALS:  Patient stated goal: return to swimming and golf    Therapist goals for Patient:   Short Term Goals: To be achieved in: 2 weeks  1. Independent in HEP and progression per patient tolerance, in order to prevent re-injury. 2. Patient will have a decrease in pain to facilitate improvement in movement, function, and ADLs as indicated by Functional Deficits. Long Term Goals: To be achieved in: 8-10 weeks  1. Disability index score of 25% or less for the LEFS to assist with reaching prior level of function.    2. Patient will demonstrate increased AROM to equal to R without pain

## 2018-10-26 ENCOUNTER — HOSPITAL ENCOUNTER (OUTPATIENT)
Dept: PHYSICAL THERAPY | Age: 63
Setting detail: THERAPIES SERIES
Discharge: HOME | End: 2018-10-26
Payer: COMMERCIAL

## 2018-10-26 PROCEDURE — 97110 THERAPEUTIC EXERCISES: CPT

## 2018-10-26 NOTE — FLOWSHEET NOTE
for functional self-care, mobility, lifting and ambulation/stair navigation   [] (38089)Reviewed/Progressed HEP activities related to improving balance, coordination, kinesthetic sense, posture, motor skill, proprioception of core, proximal hip and LE for self care, mobility, lifting, and ambulation/stair navigation      Manual Treatments:  PROM / STM / Oscillations-Mobs:  G-I, II, III, IV (PA's, Inf., Post.)  [] (50362) Provided manual therapy to mobilize LE, proximal hip and/or LS spine soft tissue/joints for the purpose of modulating pain, promoting relaxation,  increasing ROM, reducing/eliminating soft tissue swelling/inflammation/restriction, improving soft tissue extensibility and allowing for proper ROM for normal function with self care, mobility, lifting and ambulation. Modalities:  [x] (64756) Vasopneumatic compression: Utilized vasopneumatic compression to decrease edema / swelling for the purpose of improving mobility and quad tone / recruitment which will allow for increased overall function including but not limited to self-care, transfers, ambulation, and ascending / descending stairs. Modalities:   Vaso - declined    Charges:  Timed Code Treatment Minutes: 45   Total Treatment Minutes: 50     [] EVAL - LOW (14343)   [] EVAL - MOD (97310)  [] EVAL - HIGH (82157)  [] RE-EVAL (82950)   [x] ND(77531) x  3   [] IONTO   [] NMR (49854) x      [] VASO  [] Manual (78737) x       [] Other:  [] TA x       [] Mech Traction (22839)  [] ES(attended) (39578)      [] ES (un) (73587):     GOALS:  Patient stated goal: return to swimming and golf    Therapist goals for Patient:   Short Term Goals: To be achieved in: 2 weeks  1. Independent in HEP and progression per patient tolerance, in order to prevent re-injury. 2. Patient will have a decrease in pain to facilitate improvement in movement, function, and ADLs as indicated by Functional Deficits. Long Term Goals: To be achieved in: 8-10 weeks  1.

## 2018-11-01 ENCOUNTER — HOSPITAL ENCOUNTER (OUTPATIENT)
Dept: PHYSICAL THERAPY | Age: 63
Setting detail: THERAPIES SERIES
Discharge: HOME | End: 2018-11-01
Payer: COMMERCIAL

## 2018-11-01 PROCEDURE — 97110 THERAPEUTIC EXERCISES: CPT | Performed by: PHYSICAL THERAPIST

## 2018-11-01 PROCEDURE — 97140 MANUAL THERAPY 1/> REGIONS: CPT | Performed by: PHYSICAL THERAPIST

## 2018-11-01 NOTE — FLOWSHEET NOTE
[facility], [place]    Physical Therapy Daily Treatment Note  Date:  2018    Patient Name:  Hawk Dempsey    :  1955  MRN: [id number]  Medical/Treatment Diagnosis Information:  Diagnosis: S83.282 (ICD-10-CM) - Tear of lateral meniscus of left knee, current, unspecified tear type; L knee plm dos: 18  Treatment Diagnosis: R26.2 difficulty walking  Insurance/Certification information:  PT Insurance Information: [facility]  Physician Information:  Referring Practitioner: Dr. Godfrey Rogel of care signed (Y/N): Y    Date of Patient follow up with Physician:     G-Code (if applicable):      Date G-Code Applied:         Progress Note: []  Yes  [x]  No  Next due by: Visit #10  PROGRESS NOTE NPV     Latex Allergy:  [x]NO      []YES  Preferred Language for Healthcare:   [x]English       []other:    Visit # Insurance Allowable   9 MN     Pain level:  6/10     SUBJECTIVE: Pt. States that his leg is more sore today throughout lateral leg. Pt. Notes that it started hurting more about 4 days previous after going to gym and doing the leg press and bike as well as swimming. Pt. States that he also had to drive ~500+ miles yesterday and despite getting out and taking breaks pt. Reports that he had increased pain from that as well.      OBJECTIVE:     Palpation: tenderness distal ITB, distal lateral quad    Quad: fair+       PROM AROM - NT     L R L R   Knee Flexion 130        Knee Extension              Girth (cm) Pre-vaso Post-vaso    L R L R   Mid-patellar NT   Suprapatellar NT       RESTRICTIONS/PRECAUTIONS: s/p L knee plm dos: 18     Hx of B CAMILLA    Exercises/Interventions:     Therapeutic Exercises  Resistance / level Sets/sec Reps Notes   Incline board calf stretch  30\" 5    Seated HS stretch  30\" 5    Standing quad stretch stool 30\" 5 Start 10/2   Heel slides - knee flexion PROM  10\" 10           SLR - flexion; semi-reclined  3 10  modified to

## 2018-11-02 ENCOUNTER — APPOINTMENT (OUTPATIENT)
Dept: PHYSICAL THERAPY | Age: 63
End: 2018-11-02
Payer: COMMERCIAL

## 2018-11-05 ENCOUNTER — HOSPITAL ENCOUNTER (OUTPATIENT)
Dept: PHYSICAL THERAPY | Age: 63
Setting detail: THERAPIES SERIES
Discharge: HOME | End: 2018-11-05
Payer: COMMERCIAL

## 2018-11-05 PROCEDURE — 97112 NEUROMUSCULAR REEDUCATION: CPT

## 2018-11-05 PROCEDURE — G8979 MOBILITY GOAL STATUS: HCPCS

## 2018-11-05 PROCEDURE — 97110 THERAPEUTIC EXERCISES: CPT

## 2018-11-05 PROCEDURE — G8978 MOBILITY CURRENT STATUS: HCPCS

## 2018-11-05 NOTE — PLAN OF CARE
Date G-Code Applied:  11/5/18  PT G-Codes  Functional Assessment Tool Used: LEFS  Score: 48% limitations  Functional Limitation: Mobility: Walking and moving around  Mobility: Walking and Moving Around Current Status (): At least 40 percent but less than 60 percent impaired, limited or restricted  Mobility: Walking and Moving Around Goal Status (): At least 20 percent but less than 40 percent impaired, limited or restricted    Progress Note: []  Yes  [x]  No  Next due by: Visit #20     Latex Allergy:  [x]NO      []YES  Preferred Language for Healthcare:   [x]English       []other:    Visit # Insurance Allowable   10 MN     Pain level:  3/10     SUBJECTIVE: Pt's wife concerned about pt flying several times in upcomming weeks including long trip to [place] upcoming. Pt feels he is doing well overall and has less pain currently.       OBJECTIVE: 11/5    Palpation: tenderness distal ITB, distal lateral quad    Quad: fair+       PROM AROM - NT     L R L R   Knee Flexion 130        Knee Extension            Girth (cm) Pre-vaso Post-vaso    L R L R   Mid-patellar NT   Suprapatellar NT     Strength (0-5)  Left Right   Hip Flexion - supine  4- nt    Hip Flexion - seated  3+  nt   Hip Abduction  3+  nt   Hip Adduction  nt  nt   Quads  4-  nt   Hamstrings  3+  nt       RESTRICTIONS/PRECAUTIONS: s/p L knee plm dos: 9/25/18     Hx of B CAMILLA    Exercises/Interventions:     Therapeutic Exercises  Resistance / level Sets/sec Reps Notes   Incline board calf stretch  30\" 5    Seated HS stretch  30\" 5    Standing quad stretch stool 30\" 5 Start 10/2   Heel slides - knee flexion PROM  10\" 10           SLR - flexion; semi-reclined  3 10 9/28 modified to semi-reclined   SLR - abduction 1# 3 10 ^10/19    bridging BS 10\" 10 Start 9/28  ^11/5          Calf raises  3 10 Start 9/28   Nautilus HS curls 35# 3 10 ^10/11  ^11/5   LAQ (90-30) nautilus 25# 3 10 ^10/11    ^11/5   Leg press 11/1 held due to pain, resume NPV   Bike L3 5' increase in Strength to at least 4+/5 throughout as well as good proximal hip strength and control to allow for proper functional mobility as indicated by patients Functional Deficits. ONGOING  4. Patient will return to functional activities including walking and lateral movements without increased symptoms or restriction. ONGOING  5. Patient will return to swimming without increased symptoms or restriction. ONGOING    Progression Towards Functional goals:  [x] Patient is progressing as expected towards functional goals listed. [] Progression is slowed due to complexities listed. [] Progression has been slowed due to co-morbidities. [] Plan just implemented, too soon to assess goals progression  [] Other:     Persisting Functional Limitations/Impairments:  []Sitting [x]Standing   [x]Walking [x]Squatting/bending    [x]Stairs [x]ADL's    []Transfers []Reaching  [x]Housework [x]Job related tasks  [x]Driving [x]Sports/Recreation   []Other:    ASSESSMENT:  Pt continues to be limited in strength and was challenged by upgrades to nautilus weights this date. Pt. Continues to have significant deficits in functional strength. Pt will continue to benefit from skilled therapy to work on improving strength in order to return to full PLOF without limitations and return to gym without difficulty.      Treatment/Activity Tolerance:  [x] Patient tolerated treatment well [] Patient limited by fatique  [] Patient limited by pain  [] Patient limited by other medical complications  [] Other:    Prognosis: [x] Good [] Fair  [] Poor    Patient Requires Follow-up: [x] Yes  [] No    Return to Play:    [x]  N/A   []  Stage 1: Intro to Strength   []  Stage 2: Return to Run and Strength   []  Stage 3: Return to Jump and Strength   []  Stage 4: Dynamic Strength and Agility   []  Stage 5: Sport Specific Training     []  Ready to Return to Play, Meets All Above Stages   []  Not Ready for Return to Sports   Comments: PLAN: 1-2x/wk  [x] Continue per plan of care [] Alter current plan (see comments)  [] Plan of care initiated [] Hold pending MD visit [] Discharge    Electronically signed by: Opal Chao PT

## 2018-11-16 ENCOUNTER — HOSPITAL ENCOUNTER (OUTPATIENT)
Dept: PHYSICAL THERAPY | Age: 63
Setting detail: THERAPIES SERIES
Discharge: HOME | End: 2018-11-16
Payer: COMMERCIAL

## 2018-11-16 PROCEDURE — 97110 THERAPEUTIC EXERCISES: CPT

## 2018-11-16 PROCEDURE — 97112 NEUROMUSCULAR REEDUCATION: CPT

## 2018-11-16 NOTE — FLOWSHEET NOTE
ADLs as indicated by Functional Deficits. MET    Long Term Goals: To be achieved in: 8-10 weeks  1. Disability index score of 25% or less for the LEFS to assist with reaching prior level of function. ONGOING   2. Patient will demonstrate increased AROM to equal to R without pain to allow for proper joint functioning as indicated by patients Functional Deficits. ONGOING  3. Patient will demonstrate an increase in Strength to at least 4+/5 throughout as well as good proximal hip strength and control to allow for proper functional mobility as indicated by patients Functional Deficits. ONGOING  4. Patient will return to functional activities including walking and lateral movements without increased symptoms or restriction. ONGOING  5. Patient will return to swimming without increased symptoms or restriction. ONGOING    Progression Towards Functional goals:  [x] Patient is progressing as expected towards functional goals listed. [] Progression is slowed due to complexities listed. [] Progression has been slowed due to co-morbidities. [] Plan just implemented, too soon to assess goals progression  [] Other:     Persisting Functional Limitations/Impairments:  []Sitting [x]Standing   [x]Walking [x]Squatting/bending    [x]Stairs [x]ADL's    []Transfers []Reaching  [x]Housework [x]Job related tasks  [x]Driving [x]Sports/Recreation   []Other:    ASSESSMENT:  Upgraded exercises and weights per bolded activity on above flow sheet. Pt tolerated upgrades without increased pain but was fatigued and challenged due to weakness and decreased endurance in L LE post surgery. Continue to work on improving strength, endurance and balance for improved stability and safety.       Treatment/Activity Tolerance:  [x] Patient tolerated treatment well [] Patient limited by fatique  [] Patient limited by pain  [] Patient limited by other medical complications  [] Other:    Prognosis: [x] Good [] Fair  [] Poor    Patient Requires Follow-up: [x] Yes  [] No    Return to Play:    [x]  N/A   []  Stage 1: Intro to Strength   []  Stage 2: Return to Run and Strength   []  Stage 3: Return to Jump and Strength   []  Stage 4: Dynamic Strength and Agility   []  Stage 5: Sport Specific Training     []  Ready to Return to Play, Meets All Above Stages   []  Not Ready for Return to Sports   Comments:            PLAN: 1-2x/wk  [x] Continue per plan of care [] Alter current plan (see comments)  [] Plan of care initiated [] Hold pending MD visit [] Discharge    Electronically signed by: Dallas Shows [id number]

## 2018-11-27 ENCOUNTER — HOSPITAL ENCOUNTER (OUTPATIENT)
Dept: PHYSICAL THERAPY | Age: 63
Setting detail: THERAPIES SERIES
Discharge: HOME | End: 2018-11-27
Payer: COMMERCIAL

## 2018-11-27 PROCEDURE — 97110 THERAPEUTIC EXERCISES: CPT | Performed by: PHYSICAL THERAPIST

## 2018-11-27 PROCEDURE — 97112 NEUROMUSCULAR REEDUCATION: CPT | Performed by: PHYSICAL THERAPIST

## 2018-11-27 NOTE — FLOWSHEET NOTE
activities related to strengthening, flexibility, endurance, ROM of core, proximal hip and LE for functional self-care, mobility, lifting and ambulation/stair navigation   [] (29596)Reviewed/Progressed HEP activities related to improving balance, coordination, kinesthetic sense, posture, motor skill, proprioception of core, proximal hip and LE for self care, mobility, lifting, and ambulation/stair navigation      Manual Treatments:  PROM / STM / Oscillations-Mobs:  G-I, II, III, IV (PA's, Inf., Post.)  [] (64597) Provided manual therapy to mobilize LE, proximal hip and/or LS spine soft tissue/joints for the purpose of modulating pain, promoting relaxation,  increasing ROM, reducing/eliminating soft tissue swelling/inflammation/restriction, improving soft tissue extensibility and allowing for proper ROM for normal function with self care, mobility, lifting and ambulation. Modalities:  [x] (11492) Vasopneumatic compression: Utilized vasopneumatic compression to decrease edema / swelling for the purpose of improving mobility and quad tone / recruitment which will allow for increased overall function including but not limited to self-care, transfers, ambulation, and ascending / descending stairs. Modalities:   Vaso - declined    Charges:  Timed Code Treatment Minutes: 43   Total Treatment Minutes: 47     [] EVAL - LOW (10258)   [] EVAL - MOD (07701)  [] EVAL - HIGH (45770)   [] RE-EVAL (62064)   [x] [place](04229) x  2   [] IONTO   [x] NMR (61366) x  2   [] VASO  [] Manual (73979) x       [] Other:  [] TA x       [] Mech Traction (36554)  [] ES(attended) (69750)      [] ES (un) (71855):     GOALS:  Patient stated goal: return to swimming and golf    Therapist goals for Patient:   Short Term Goals: To be achieved in: 2 weeks  1. Independent in HEP and progression per patient tolerance, in order to prevent re-injury. MET  2.  Patient will have a decrease in pain to facilitate improvement in movement, function, and

## 2018-12-03 ENCOUNTER — APPOINTMENT (OUTPATIENT)
Dept: PHYSICAL THERAPY | Age: 63
End: 2018-12-03
Payer: COMMERCIAL

## 2018-12-06 ENCOUNTER — HOSPITAL ENCOUNTER (OUTPATIENT)
Dept: PHYSICAL THERAPY | Age: 63
Setting detail: THERAPIES SERIES
Discharge: HOME | End: 2018-12-06
Payer: COMMERCIAL

## 2018-12-06 PROCEDURE — 97112 NEUROMUSCULAR REEDUCATION: CPT | Performed by: PHYSICAL THERAPIST

## 2018-12-06 PROCEDURE — 97110 THERAPEUTIC EXERCISES: CPT | Performed by: PHYSICAL THERAPIST

## 2018-12-20 ENCOUNTER — HOSPITAL ENCOUNTER (OUTPATIENT)
Dept: PHYSICAL THERAPY | Age: 63
Setting detail: THERAPIES SERIES
Discharge: HOME | End: 2018-12-20
Payer: COMMERCIAL

## 2018-12-20 NOTE — FLOWSHEET NOTE
[facility]    Physical Therapy  Cancellation/No-show Note  Patient Name:  Durga Khan  :  1955   Date:  2018  Cancelled visits to date: 1  No-shows to date: 0    For today's appointment patient:  [x]  Cancelled  []  Rescheduled appointment  []  No-show     Reason given by patient:  [x]  Patient ill  []  Conflicting appointment  []  No transportation    []  Conflict with work  []  No reason given  []  Other:     Comments:      Electronically signed by:  Ezequiel Contreras PT

## 2018-12-27 ENCOUNTER — HOSPITAL ENCOUNTER (OUTPATIENT)
Dept: PHYSICAL THERAPY | Age: 63
Setting detail: THERAPIES SERIES
Discharge: HOME | End: 2018-12-27
Payer: COMMERCIAL

## 2018-12-27 PROCEDURE — G8979 MOBILITY GOAL STATUS: HCPCS | Performed by: PHYSICAL THERAPIST

## 2018-12-27 PROCEDURE — G8980 MOBILITY D/C STATUS: HCPCS | Performed by: PHYSICAL THERAPIST

## 2018-12-27 PROCEDURE — 97530 THERAPEUTIC ACTIVITIES: CPT | Performed by: PHYSICAL THERAPIST

## 2018-12-27 PROCEDURE — 97110 THERAPEUTIC EXERCISES: CPT | Performed by: PHYSICAL THERAPIST

## 2018-12-27 PROCEDURE — G8978 MOBILITY CURRENT STATUS: HCPCS | Performed by: PHYSICAL THERAPIST

## 2018-12-27 NOTE — PLAN OF CARE
Tool Used: LEFS  Score: 15% limitation  Functional Limitation: Mobility: Walking and moving around  Mobility: Walking and Moving Around Current Status (): At least 1 percent but less than 20 percent impaired, limited or restricted  Mobility: Walking and Moving Around Goal Status (): At least 20 percent but less than 40 percent impaired, limited or restricted  Mobility: Walking and Moving Around Discharge Status (): At least 1 percent but less than 20 percent impaired, limited or restricted    Progress Note: [x]  Yes  []  No  Next due by: Visit #20     Latex Allergy:  [x]NO      []YES  Preferred Language for Healthcare:   [x]English       []other:    Visit # Insurance Allowable   14 [place]     Pain level:  2-3/10     SUBJECTIVE: Pt. Reports that his knee is feeling better overall. The pain in the back of his knee is slowly improving but he continues to have pain when getting up after sitting for a longer period of time. Pt. States that he has no difficulty with stairs or walking at this time. Pt. States that he is ready for d/c to HEP.          OBJECTIVE: 12/27    Palpation: mild tenderness distal lateral HS tendon    Quad: good     PROM AROM - NT     L R L R   Knee Flexion 138        Knee Extension 0           Strength (0-5)  Left Right   Hip Flexion - seated 4 4+   Hip Abduction 4 4   Quads 5 5   Hamstrings 4+ 4+       RESTRICTIONS/PRECAUTIONS: s/p L knee plm dos: 9/25/18     Hx of B CAMILLA    Exercises/Interventions:     Therapeutic Exercises  Resistance / level Sets/sec Reps Notes   Incline board calf stretch  30\" 5    Seated HS stretch  30\" 5    Standing quad stretch stool 30\" 5 Start 10/2   Heel slides - knee flexion PROM  10\" 10           SLR - flexion; semi-reclined 2# 3 10 ^12/6   SLR - abduction 2# 3 10 ^10/19  ^11/16   bridging BS 10\" 10 ^11/5          Calf raises  3 10 Start 9/28   Nautilus HS curls DL 45# 3 10 ^11/27   LAQ (90-40) nautilus DL 45# 3 10 ^12/6   Leg press # 3 10 ^12/27   Bike L3 5'  Start 10/11          Neuromuscular Re-ed / Therapeutic Activities       Quad sets  10\" 10     10/5   biodex balance PS L10 3'  ^10/19   Lateral stepping Green band 3 laps  Start 10/19   ^11/1   Single leg balance  10\" 3 Added 11/16   Added 11/16   Added 11/16          Manual Intervention       Knee mobs/PROM       Tib/Fem Mobs       Patella Mobs       Ankle mobs       STM Distal lateral HS 4'              Patient Education:  -reviewed HEP    Therapeutic Exercise and NMR EXR  [x] (63125) Provided verbal/tactile cueing for activities related to strengthening, flexibility, endurance, ROM for improvements in LE, proximal hip, and core control with self care, mobility, lifting, ambulation.  [] (42579) Provided verbal/tactile cueing for activities related to improving balance, coordination, kinesthetic sense, posture, motor skill, proprioception  to assist with LE, proximal hip, and core control in self care, mobility, lifting, ambulation and eccentric single leg control.      NMR and Therapeutic Activities:    [x] (90121 or 67412) Provided verbal/tactile cueing for activities related to improving balance, coordination, kinesthetic sense, posture, motor skill, proprioception and motor activation to allow for proper function of core, proximal hip and LE with self care and ADLs  [] (82048) Gait Re-education- Provided training and instruction to the patient for proper LE, core and proximal hip recruitment and positioning and eccentric body weight control with ambulation re-education including up and down stairs     Home Exercise Program:    [x] (43985) Reviewed/Progressed HEP activities related to strengthening, flexibility, endurance, ROM of core, proximal hip and LE for functional self-care, mobility, lifting and ambulation/stair navigation   [] (82273)Reviewed/Progressed HEP activities related to improving balance, coordination, kinesthetic sense, posture, motor skill, proprioception of core, proximal hip and LE for

## 2019-01-03 ENCOUNTER — OFFICE VISIT (OUTPATIENT)
Dept: ORTHOPEDIC SURGERY | Age: 64
End: 2019-01-03
Payer: COMMERCIAL

## 2019-01-03 VITALS
SYSTOLIC BLOOD PRESSURE: 105 MMHG | WEIGHT: 240 LBS | BODY MASS INDEX: 30.8 KG/M2 | HEART RATE: 64 BPM | HEIGHT: 74 IN | DIASTOLIC BLOOD PRESSURE: 72 MMHG

## 2019-01-03 DIAGNOSIS — S83.282D TEAR OF LATERAL MENISCUS OF LEFT KNEE, CURRENT, UNSPECIFIED TEAR TYPE, SUBSEQUENT ENCOUNTER: Primary | ICD-10-CM

## 2019-01-03 PROCEDURE — 3017F COLORECTAL CA SCREEN DOC REV: CPT | Performed by: ORTHOPAEDIC SURGERY

## 2019-01-03 PROCEDURE — 1036F TOBACCO NON-USER: CPT | Performed by: ORTHOPAEDIC SURGERY

## 2019-01-03 PROCEDURE — G8427 DOCREV CUR MEDS BY ELIG CLIN: HCPCS | Performed by: ORTHOPAEDIC SURGERY

## 2019-01-03 PROCEDURE — G8417 CALC BMI ABV UP PARAM F/U: HCPCS | Performed by: ORTHOPAEDIC SURGERY

## 2019-01-03 PROCEDURE — G8484 FLU IMMUNIZE NO ADMIN: HCPCS | Performed by: ORTHOPAEDIC SURGERY

## 2019-01-03 PROCEDURE — 99212 OFFICE O/P EST SF 10 MIN: CPT | Performed by: ORTHOPAEDIC SURGERY

## 2019-03-25 NOTE — FLOWSHEET NOTE
[facility], [place]    Physical Therapy Daily Treatment Note  Date:  10/5/2018    Patient Name:  Griffin Alvarez    :  1955  MRN: [id number]  Medical/Treatment Diagnosis Information:  Diagnosis: S83.282 (ICD-10-CM) - Tear of lateral meniscus of left knee, current, unspecified tear type; L knee plm dos: 18  Treatment Diagnosis: R26.2 difficulty walking  Insurance/Certification information:  PT Insurance Information: Lyudmila  Physician Information:  Referring Practitioner: Dr. Janeen Aleman of care signed (Y/N): Y    Date of Patient follow up with Physician:     G-Code (if applicable):      Date G-Code Applied:         Progress Note: []  Yes  [x]  No  Next due by: Visit #10       Latex Allergy:  [x]NO      []YES  Preferred Language for Healthcare:   [x]English       []other:    Visit # Insurance Allowable   4 [place]     Pain level:  4/10     SUBJECTIVE: Pt. Reports that his knee is feeling a little better. Pt. Reports that he is no longer taking the pain medication but is taking ibuprofen. Pt. States that he has been more active and is able to walk some without AD.      OBJECTIVE: 10/5    Incisions: sutures removed, slight white/yellowish drainage from superior incision, MD staff informed  Quad: fair+      PROM AROM - NT     L R L R   Knee Flexion 128        Knee Extension              Girth (cm) Pre-vaso Post-vaso    L R L R   Mid-patellar NT   Suprapatellar NT       RESTRICTIONS/PRECAUTIONS: s/p L knee plm dos: 18     Hx of B CAMILLA    Exercises/Interventions:     Therapeutic Exercises  Resistance / level Sets/sec Reps Notes   Seated gastroc stretch - towel pull  30\" 5    Seated HS stretch  30\" 5    Standing quad stretch stool 30\" 5 Start 10/2   Heel slides - knee flexion PROM  10\" 10           SLR - flexion; semi-reclined  3 10  modified to semi-reclined   SLR - abduction  3 10    Hip add sets BS 10\" 10 Start           Calf raises  3 10 Start  4+/5 throughout as well as good proximal hip strength and control to allow for proper functional mobility as indicated by patients Functional Deficits. 4. Patient will return to functional activities including walking and lateral movements without increased symptoms or restriction. 5. Patient will return to swimming without increased symptoms or restriction. Progression Towards Functional goals:  [] Patient is progressing as expected towards functional goals listed. [] Progression is slowed due to complexities listed. [] Progression has been slowed due to co-morbidities. [x] Plan just implemented, too soon to assess goals progression  [] Other:     Persisting Functional Limitations/Impairments:  []Sitting [x]Standing   [x]Walking [x]Squatting/bending    [x]Stairs [x]ADL's    []Transfers []Reaching  [x]Housework [x]Job related tasks  [x]Driving [x]Sports/Recreation   []Other:    ASSESSMENT:    Treatment/Activity Tolerance:  [x] Patient tolerated treatment well [] Patient limited by fatique  [] Patient limited by pain  [] Patient limited by other medical complications  [x] Other: Pt. Tolerated therapy today with minimal complaints. Sutures removed with sight drainage noted. MD staff informed. Pt. Continues to be challenged by SLR. Pt. Demonstrates slight increase in knee flexion ROM but notes strong stretch through quad. Able to increase resistance with LAQ and HS curls without issue. Pt. Requires UE support throughout with biodex balance. Pt. Demonstrates improved gait without AD throughout clinic. Pt. Requires continued progression of post-op protocol in order to return to PLOF.      Prognosis: [x] Good [] Fair  [] Poor    Patient Requires Follow-up: [x] Yes  [] No    Return to Play:    [x]  N/A   []  Stage 1: Intro to Strength   []  Stage 2: Return to Run and Strength   []  Stage 3: Return to Jump and Strength   []  Stage 4: Dynamic Strength and Agility   []  Stage 5: Sport Specific Training     [] 0 = independent

## 2019-06-03 ENCOUNTER — OFFICE VISIT (OUTPATIENT)
Dept: ORTHOPEDIC SURGERY | Age: 64
End: 2019-06-03
Payer: COMMERCIAL

## 2019-06-03 VITALS
WEIGHT: 240 LBS | SYSTOLIC BLOOD PRESSURE: 138 MMHG | DIASTOLIC BLOOD PRESSURE: 77 MMHG | HEART RATE: 65 BPM | HEIGHT: 74 IN | BODY MASS INDEX: 30.8 KG/M2

## 2019-06-03 DIAGNOSIS — S83.282D TEAR OF LATERAL MENISCUS OF LEFT KNEE, CURRENT, UNSPECIFIED TEAR TYPE, SUBSEQUENT ENCOUNTER: Primary | ICD-10-CM

## 2019-06-03 PROCEDURE — G8427 DOCREV CUR MEDS BY ELIG CLIN: HCPCS | Performed by: ORTHOPAEDIC SURGERY

## 2019-06-03 PROCEDURE — 20610 DRAIN/INJ JOINT/BURSA W/O US: CPT | Performed by: ORTHOPAEDIC SURGERY

## 2019-06-03 PROCEDURE — 1036F TOBACCO NON-USER: CPT | Performed by: ORTHOPAEDIC SURGERY

## 2019-06-03 PROCEDURE — G8417 CALC BMI ABV UP PARAM F/U: HCPCS | Performed by: ORTHOPAEDIC SURGERY

## 2019-06-03 PROCEDURE — 3017F COLORECTAL CA SCREEN DOC REV: CPT | Performed by: ORTHOPAEDIC SURGERY

## 2019-06-03 PROCEDURE — 99213 OFFICE O/P EST LOW 20 MIN: CPT | Performed by: ORTHOPAEDIC SURGERY

## 2019-06-03 NOTE — PROGRESS NOTES
Patient returns today for his left knee. Approximate 6 months ago he had a debridement notchplasty for valgus osteoarthritis. He is improved but notices that if he sits in a highchair was transported when he first gets up he has to slowly bear weight after manner so he is back to his baseline again he has no new mechanical symptoms such as catching locking or giving way. He has intermittent use of oral anti-inflammatories. He is here today really deceiving new is going on in his knee. ROS: Pertinent items are noted in HPI. Depo medrol NDC 4036-5116-79  Lot# [id number]  Exp.1/2021  Knee, left    Ropivacaine    NCD#:  [id number]  LOT#:  [id number]  Exp Date:  11/2022  Knee, left        Past Medical History:  No date: Anemia      Comment:  CHRONIC, MILD  No date: Environmental allergies  No date: Hyperlipidemia  No date: LEMUEL (obstructive sleep apnea)     Past Surgical History:  No date: HERNIA REPAIR  No date: JOINT REPLACEMENT; Bilateral      Comment:  HIP  09/25/2018: KNEE ARTHROSCOPY;  Left      Comment:  partial lateral meniscectomy, loose body removal,                synovectomy  No date: NASAL SEPTUM SURGERY  9/25/2018: [facility] [facility] KNE SURG W/MENISCECTOMY MED/LAT W/SHVG; Left      Comment:  LEFT KNEE ARTHROSCOPE, MENISCAL EXCISION/REPAIR                performed by Erich Avalos MD at [facility]    Review of patient's family history indicates:  Problem: Heart Disease      Relation: Father          Age of Onset: (Not Specified)      Social History    Socioeconomic History      Marital status:       Spouse name: None      Number of children: None      Years of education: None      Highest education level: None    Occupational History      None    Social Needs      Financial resource strain: None      Food insecurity:        Worry: None        Inability: None      Transportation needs:        Medical: None        Non-medical: None    Tobacco Use      Smoking status: Former Smoker Packs/day: 1.00        Years: 15.00        Pack years: 15        Types: Cigarettes        Quit date: 4/3/1989        Years since quittin.1      Smokeless tobacco: Never Used    Substance and Sexual Activity      Alcohol use: No      Drug use: No      Sexual activity: None    Lifestyle      Physical activity:        Days per week: None        Minutes per session: None      Stress: None    Relationships      Social connections:        Talks on phone: None        Gets together: None        Attends Oriental orthodox service: None        Active member of club or organization: None        Attends meetings of clubs or organizations: None        Relationship status: None      Intimate partner violence:        Fear of current or ex partner: None        Emotionally abused: None        Physically abused: None        Forced sexual activity: None    Other Topics      Concerns:        None    Social History Narrative      None      Current Outpatient Medications:  albuterol sulfate  (90 Base) MCG/ACT inhaler, Inhale 2 puffs into the lungs every 6 hours as needed for Wheezing, Disp: , Rfl:   Cetirizine HCl (ZYRTEC ALLERGY) 10 MG CAPS, Take by mouth daily as needed , Disp: , Rfl:   azelastine (OPTIVAR) 0.05 % ophthalmic solution, Place 1 drop into both eyes daily as needed , Disp: , Rfl:   Triamcinolone Acetonide (NASACORT ALLERGY 24HR) 55 MCG/ACT AERO, 2 sprays by Nasal route daily as needed , Disp: , Rfl:   ibuprofen (ADVIL;MOTRIN) 200 MG tablet, Take 200 mg by mouth every 6 hours as needed for Pain., Disp: , Rfl:   cephALEXin (KEFLEX) 500 MG capsule, TAKE 1 CAPSULE BY MOUTH FOUR TIMES A DAY, Disp: , Rfl: 0  diclofenac (VOLTAREN) 75 MG EC tablet, Take 1 tablet by mouth 2 times daily 1 tablet by mouth twice a day, Disp: 60 tablet, Rfl: 2    No current facility-administered medications for this visit.         -- Dye [Gadolinium Derivatives]     VITAL SIGNS:  /77   Pulse 65   Ht 6' 2\" (1.88 m)   Wt 240 lb (108.9 kg)

## 2019-06-03 NOTE — PROGRESS NOTES
Depo medrol NDC 1004-3080-89  Lot# [id number]  Exp.1/2021  Knee, left    Ropivacaine    [facility]#:  [id number]  LOT#:  [id number]  Exp Date:  11/2022  Knee, left

## 2021-09-29 ENCOUNTER — OFFICE VISIT (OUTPATIENT)
Dept: ORTHOPEDIC SURGERY | Age: 66
End: 2021-09-29
Payer: COMMERCIAL

## 2021-09-29 VITALS — WEIGHT: 240 LBS | BODY MASS INDEX: 30.8 KG/M2 | HEIGHT: 74 IN

## 2021-09-29 DIAGNOSIS — M25.562 LEFT KNEE PAIN, UNSPECIFIED CHRONICITY: Primary | ICD-10-CM

## 2021-09-29 DIAGNOSIS — M17.12 ARTHRITIS OF KNEE, LEFT: ICD-10-CM

## 2021-09-29 PROCEDURE — 20610 DRAIN/INJ JOINT/BURSA W/O US: CPT | Performed by: ORTHOPAEDIC SURGERY

## 2021-09-29 PROCEDURE — G8417 CALC BMI ABV UP PARAM F/U: HCPCS | Performed by: ORTHOPAEDIC SURGERY

## 2021-09-29 PROCEDURE — 99213 OFFICE O/P EST LOW 20 MIN: CPT | Performed by: ORTHOPAEDIC SURGERY

## 2021-09-29 PROCEDURE — 1036F TOBACCO NON-USER: CPT | Performed by: ORTHOPAEDIC SURGERY

## 2021-09-29 PROCEDURE — G8427 DOCREV CUR MEDS BY ELIG CLIN: HCPCS | Performed by: ORTHOPAEDIC SURGERY

## 2021-09-29 PROCEDURE — 4040F PNEUMOC VAC/ADMIN/RCVD: CPT | Performed by: ORTHOPAEDIC SURGERY

## 2021-09-29 PROCEDURE — 1123F ACP DISCUSS/DSCN MKR DOCD: CPT | Performed by: ORTHOPAEDIC SURGERY

## 2021-09-29 PROCEDURE — 3017F COLORECTAL CA SCREEN DOC REV: CPT | Performed by: ORTHOPAEDIC SURGERY

## 2021-09-29 RX ORDER — METHYLPREDNISOLONE ACETATE 40 MG/ML
40 INJECTION, SUSPENSION INTRA-ARTICULAR; INTRALESIONAL; INTRAMUSCULAR; SOFT TISSUE ONCE
Status: COMPLETED | OUTPATIENT
Start: 2021-09-29 | End: 2021-09-29

## 2021-09-29 RX ORDER — ROPIVACAINE HYDROCHLORIDE 5 MG/ML
30 INJECTION, SOLUTION EPIDURAL; INFILTRATION; PERINEURAL ONCE
Status: COMPLETED | OUTPATIENT
Start: 2021-09-29 | End: 2021-09-29

## 2021-09-29 RX ADMIN — METHYLPREDNISOLONE ACETATE 40 MG: 40 INJECTION, SUSPENSION INTRA-ARTICULAR; INTRALESIONAL; INTRAMUSCULAR; SOFT TISSUE at 15:45

## 2021-09-29 RX ADMIN — ROPIVACAINE HYDROCHLORIDE 30 ML: 5 INJECTION, SOLUTION EPIDURAL; INFILTRATION; PERINEURAL at 15:45

## 2021-09-29 NOTE — PROGRESS NOTES
Administrations This Visit     methylPREDNISolone acetate (DEPO-MEDROL) injection 40 mg     Admin Date  09/29/2021  15:45 Action  Given Dose  40 mg Route  IntraMUSCular Site  Knee Left Administered By  Obdulia Botello    Ordering Provider: Sarah Ash MD    NDC: 9272-3618-56    Lot#: [id number]    : [facility]     Patient Supplied?: No          ropivacaine (NAROPIN) 0.5% injection 30 mL     Admin Date  09/29/2021  15:45 Action  Given Dose  30 mL Route  Epidural Site  Knee Left Administered By  Obdulia Botello    Ordering Provider: Sarah Ash MD    NDC: 65801-070-08    Lot#: [id number]    : [facility]    Patient Supplied?: No

## 2021-09-29 NOTE — PROGRESS NOTES
This patient returns today for his left knee. About 3 years ago with loose body removal partial lateral meniscectomy. He is known to have some 3 a lesions of his lateral femoral condyle at that time. He says when he pivots off his left leg to the left and is backing up to symptoms feel like he wants to give way. It has not locked up. ROS: Pertinent items are noted in HPI. No notes on file    Past Medical History:  No date: Anemia      Comment:  CHRONIC, MILD  No date: Environmental allergies  No date: Hyperlipidemia  No date: LEMUEL (obstructive sleep apnea)     Past Surgical History:  No date: HERNIA REPAIR  No date: JOINT REPLACEMENT; Bilateral      Comment:  HIP  2018: KNEE ARTHROSCOPY;  Left      Comment:  partial lateral meniscectomy, loose body removal,                synovectomy  No date: NASAL SEPTUM SURGERY  2018: [place] [facility]NE SURG W/MENISCECTOMY MED/LAT W/SHVG; Left      Comment:  LEFT KNEE ARTHROSCOPE, MENISCAL EXCISION/REPAIR                performed by Nikolas Parkinson MD at [address] [place] [address] of patient's family history indicates:  Problem: Heart Disease      Relation: Father          Age of Onset: (Not Specified)      Social History    Socioeconomic History      Marital status:       Spouse name: None      Number of children: None      Years of education: None      Highest education level: None    Occupational History      None    Tobacco Use      Smoking status: Former Smoker        Packs/day: 1.00        Years: 15.00        Pack years: 15        Types: Cigarettes        Quit date: 4/3/1989        Years since quittin.5      Smokeless tobacco: Never Used    Vaping Use      Vaping Use: Never used    Substance and Sexual Activity      Alcohol use: No      Drug use: No      Sexual activity: None    Other Topics      Concerns:        None    Social History Narrative      None    Social Determinants of Health  Financial Resource Strain:       Difficulty of Paying Living Expenses:   Food Insecurity:       Worried About [facility] in the Last Year:       Ran Out of Food in the Last Year:   Transportation Needs:       Lack of Transportation (Medical):       Lack of Transportation (Non-Medical):   Physical Activity:       Days of Exercise per Week:       Minutes of Exercise per Session:   Stress:       Feeling of Stress :   Social Connections:       Frequency of Communication with Friends and Family:       Frequency of Social Gatherings with Friends and Family:       Attends Anabaptism Services:       Active Member of Clubs or Organizations:       Attends Club or Organization Meetings:       Marital Status:   Intimate Partner Violence:       Fear of Current or Ex-Partner:       Emotionally Abused:       Physically Abused:       Sexually Abused:     Current Outpatient Medications:  cephALEXin (KEFLEX) 500 MG capsule, TAKE 1 CAPSULE BY MOUTH FOUR TIMES A DAY, Disp: , Rfl: 0  albuterol sulfate  (90 Base) MCG/ACT inhaler, Inhale 2 puffs into the lungs every 6 hours as needed for Wheezing, Disp: , Rfl:   diclofenac (VOLTAREN) 75 MG EC tablet, Take 1 tablet by mouth 2 times daily 1 tablet by mouth twice a day, Disp: 60 tablet, Rfl: 2  Cetirizine HCl (ZYRTEC ALLERGY) 10 MG CAPS, Take by mouth daily as needed , Disp: , Rfl:   azelastine (OPTIVAR) 0.05 % ophthalmic solution, Place 1 drop into both eyes daily as needed , Disp: , Rfl:   Triamcinolone Acetonide (NASACORT ALLERGY 24HR) 55 MCG/ACT AERO, 2 sprays by Nasal route daily as needed , Disp: , Rfl:   ibuprofen (ADVIL;MOTRIN) 200 MG tablet, Take 200 mg by mouth every 6 hours as needed for Pain., Disp: , Rfl:     No current facility-administered medications for this visit. -- Dye [Gadolinium Derivatives]     VITAL SIGNS:  Ht 6' 2\" (1.88 m)   Wt 240 lb (108.9 kg)   BMI 30.81 kg/m²   On examination today there is no warmth, erythema, or effusion. He has a mild valgus deformity.   He gets full extension flexes up to nearly 120 degrees. He has a palpable osteophyte the medial tibial plateau with little bit of medial joint line tenderness. He has just mild lateral joint line tenderness. Jose's testing does not produce a clunk or shift or cause increased pain. He has negative Lachman's and pivot shift. 3 views of the left knee were obtained today. This shows some subchondral sclerosis and an approximately 50% joint space narrowing in the lateral compartment. He has no obvious intraosseous lesions or other obvious abnormalities. Impression valgus osteoarthritis left knee. Plan: Patient has recent history of prostate cancer and so is taking quite a few medications so he was prefer not to take another oral anti-inflammatory medication. We discussed both steroid injection and viscosupplementation. He is excepted a steroid injection today. After sterile prep injected his left knee with 80 mg of Depo-Medrol and 15 mg ropivacaine. The patient tolerated this procedure well. Time reviewing all operative records x-rays interpreting new x-rays discussing treatment plans and injecting patient took approximately 20 minutes.

## 2021-10-29 ENCOUNTER — OFFICE VISIT (OUTPATIENT)
Dept: ORTHOPEDIC SURGERY | Age: 66
End: 2021-10-29
Payer: COMMERCIAL

## 2021-10-29 VITALS — BODY MASS INDEX: 30.8 KG/M2 | HEIGHT: 74 IN | WEIGHT: 240 LBS

## 2021-10-29 DIAGNOSIS — M17.12 ARTHRITIS OF KNEE, LEFT: Primary | ICD-10-CM

## 2021-10-29 PROCEDURE — G8427 DOCREV CUR MEDS BY ELIG CLIN: HCPCS | Performed by: ORTHOPAEDIC SURGERY

## 2021-10-29 PROCEDURE — 1123F ACP DISCUSS/DSCN MKR DOCD: CPT | Performed by: ORTHOPAEDIC SURGERY

## 2021-10-29 PROCEDURE — 3017F COLORECTAL CA SCREEN DOC REV: CPT | Performed by: ORTHOPAEDIC SURGERY

## 2021-10-29 PROCEDURE — 99213 OFFICE O/P EST LOW 20 MIN: CPT | Performed by: ORTHOPAEDIC SURGERY

## 2021-10-29 PROCEDURE — 1036F TOBACCO NON-USER: CPT | Performed by: ORTHOPAEDIC SURGERY

## 2021-10-29 PROCEDURE — G8484 FLU IMMUNIZE NO ADMIN: HCPCS | Performed by: ORTHOPAEDIC SURGERY

## 2021-10-29 PROCEDURE — G8417 CALC BMI ABV UP PARAM F/U: HCPCS | Performed by: ORTHOPAEDIC SURGERY

## 2021-10-29 PROCEDURE — 4040F PNEUMOC VAC/ADMIN/RCVD: CPT | Performed by: ORTHOPAEDIC SURGERY

## 2021-10-29 NOTE — PROGRESS NOTES
Returns today for left knee valgus osteoarthritis. He was injected about a month ago. He did get some relief although not complete. ROS: Pertinent items are noted in HPI. No notes on file    Past Medical History:  No date: Anemia      Comment:  CHRONIC, MILD  No date: Environmental allergies  No date: Hyperlipidemia  No date: LEMUEL (obstructive sleep apnea)     Past Surgical History:  No date: HERNIA REPAIR  No date: JOINT REPLACEMENT; Bilateral      Comment:  HIP  2018: KNEE ARTHROSCOPY;  Left      Comment:  partial lateral meniscectomy, loose body removal,                synovectomy  No date: NASAL SEPTUM SURGERY  2018: [place] [facility] KNE SURG W/MENISCECTOMY MED/LAT W/SHVG; Left      Comment:  LEFT KNEE ARTHROSCOPE, MENISCAL EXCISION/REPAIR                performed by Jose Whitlock MD at [address]  of patient's family history indicates:  Problem: Heart Disease      Relation: Father          Age of Onset: (Not Specified)      Social History    Socioeconomic History      Marital status:       Spouse name: None      Number of children: None      Years of education: None      Highest education level: None    Occupational History      None    Tobacco Use      Smoking status: Former Smoker        Packs/day: 1.00        Years: 15.00        Pack years: 15        Types: Cigarettes        Quit date: 4/3/1989        Years since quittin.5      Smokeless tobacco: Never Used    Vaping Use      Vaping Use: Never used    Substance and Sexual Activity      Alcohol use: No      Drug use: No      Sexual activity: None    Other Topics      Concerns:        None    Social History Narrative      None    Social Determinants of Health  Financial Resource Strain:       Difficulty of Paying Living Expenses:   Food Insecurity:       Worried About Running Out of Food in the Last Year:       Ran Out of Food in the Last Year:   Transportation Needs:       Lack of Transportation (Medical):       Lack of Transportation (Non-Medical):   Physical Activity:       Days of Exercise per Week:       Minutes of Exercise per Session:   Stress:       Feeling of Stress :   Social Connections:       Frequency of Communication with Friends and Family:       Frequency of Social Gatherings with Friends and Family:       Attends Mu-ism Services:       Active Member of Clubs or Organizations:       Attends Club or Organization Meetings:       Marital Status:   Intimate Partner Violence:       Fear of Current or Ex-Partner:       Emotionally Abused:       Physically Abused:       Sexually Abused:     Current Outpatient Medications:  cephALEXin (KEFLEX) 500 MG capsule, TAKE 1 CAPSULE BY MOUTH FOUR TIMES A DAY, Disp: , Rfl: 0  albuterol sulfate  (90 Base) MCG/ACT inhaler, Inhale 2 puffs into the lungs every 6 hours as needed for Wheezing, Disp: , Rfl:   diclofenac (VOLTAREN) 75 MG EC tablet, Take 1 tablet by mouth 2 times daily 1 tablet by mouth twice a day, Disp: 60 tablet, Rfl: 2  Cetirizine HCl (ZYRTEC ALLERGY) 10 MG CAPS, Take by mouth daily as needed , Disp: , Rfl:   azelastine (OPTIVAR) 0.05 % ophthalmic solution, Place 1 drop into both eyes daily as needed , Disp: , Rfl:   Triamcinolone Acetonide (NASACORT ALLERGY 24HR) 55 MCG/ACT AERO, 2 sprays by Nasal route daily as needed , Disp: , Rfl:   ibuprofen (ADVIL;MOTRIN) 200 MG tablet, Take 200 mg by mouth every 6 hours as needed for Pain., Disp: , Rfl:     No current facility-administered medications for this visit. -- Dye [Gadolinium Derivatives]     VITAL SIGNS:  Ht 6' 2\" (1.88 m)   Wt 240 lb (108.9 kg)   BMI 30.81 kg/m²   On examination of left knee today he has no warmth, erythema, or effusion. He does have some pseudolaxity laterally of about 3 to 4 mm. Jose's testing does not increase pain or produce clunk or shift. He is tender lateral joint with some palpable osteophytes particularly proximal tibia but also some medial proximal tibia osteophytes. He has negative Lachman's and pivot shift. Impression valgus osteoarthritis left knee. Patient has known prostate cancer and has a lesion he says around his hips which have been replaced as well in the rib and in his groin. He has some questions about appropriate exercise form which we went over. We also discussed viscosupplementation and he would like to try this. We will order this and see him back if this is approved.

## 2021-11-08 ENCOUNTER — TELEPHONE (OUTPATIENT)
Dept: ORTHOPEDIC SURGERY | Age: 66
End: 2021-11-08

## 2021-11-08 NOTE — TELEPHONE ENCOUNTER
Called and LM that left knee Wuflexxa injection covered -- Left # FOR PATIENT TO CALL BACK AND SCHEDULE -- PLEASE SCHEDULE PATIENT WHEN HE CALLS BACK

## 2021-11-12 ENCOUNTER — OFFICE VISIT (OUTPATIENT)
Dept: ORTHOPEDIC SURGERY | Age: 66
End: 2021-11-12
Payer: COMMERCIAL

## 2021-11-12 VITALS — HEIGHT: 74 IN | BODY MASS INDEX: 30.8 KG/M2 | WEIGHT: 240 LBS

## 2021-11-12 DIAGNOSIS — M17.12 ARTHRITIS OF KNEE, LEFT: Primary | ICD-10-CM

## 2021-11-12 PROCEDURE — 3017F COLORECTAL CA SCREEN DOC REV: CPT | Performed by: ORTHOPAEDIC SURGERY

## 2021-11-12 PROCEDURE — 1123F ACP DISCUSS/DSCN MKR DOCD: CPT | Performed by: ORTHOPAEDIC SURGERY

## 2021-11-12 PROCEDURE — 1036F TOBACCO NON-USER: CPT | Performed by: ORTHOPAEDIC SURGERY

## 2021-11-12 PROCEDURE — 4040F PNEUMOC VAC/ADMIN/RCVD: CPT | Performed by: ORTHOPAEDIC SURGERY

## 2021-11-12 PROCEDURE — 20610 DRAIN/INJ JOINT/BURSA W/O US: CPT | Performed by: ORTHOPAEDIC SURGERY

## 2021-11-12 PROCEDURE — G8484 FLU IMMUNIZE NO ADMIN: HCPCS | Performed by: ORTHOPAEDIC SURGERY

## 2021-11-12 PROCEDURE — G8427 DOCREV CUR MEDS BY ELIG CLIN: HCPCS | Performed by: ORTHOPAEDIC SURGERY

## 2021-11-12 PROCEDURE — G8417 CALC BMI ABV UP PARAM F/U: HCPCS | Performed by: ORTHOPAEDIC SURGERY

## 2021-11-12 RX ORDER — HYALURONATE SODIUM 10 MG/ML
20 SYRINGE (ML) INTRAARTICULAR ONCE
Status: COMPLETED | OUTPATIENT
Start: 2021-11-12 | End: 2021-11-12

## 2021-11-12 RX ADMIN — Medication 20 MG: at 10:56

## 2021-11-12 NOTE — PROGRESS NOTES
Today for his first viscosupplementation left knee. He has valgus osteoarthritis. ROS: Pertinent items are noted in HPI. No notes on file    Past Medical History:  No date: Anemia      Comment:  CHRONIC, MILD  No date: Environmental allergies  No date: Hyperlipidemia  No date: LEMUEL (obstructive sleep apnea)     Past Surgical History:  No date: HERNIA REPAIR  No date: JOINT REPLACEMENT; Bilateral      Comment:  HIP  2018: KNEE ARTHROSCOPY; Left      Comment:  partial lateral meniscectomy, loose body removal,                synovectomy  No date: NASAL SEPTUM SURGERY  2018: [place] [facility] KNE SURG W/MENISCECTOMY MED/LAT W/SHVG; Left      Comment:  LEFT KNEE ARTHROSCOPE, MENISCAL EXCISION/REPAIR                performed by Tamiko Henderson MD at [facility] of patient's family history indicates:  Problem: Heart Disease      Relation: Father          Age of Onset: (Not Specified)      Social History    Socioeconomic History      Marital status:       Spouse name: None      Number of children: None      Years of education: None      Highest education level: None    Occupational History      None    Tobacco Use      Smoking status: Former Smoker        Packs/day: 1.00        Years: 15.00        Pack years: 15        Types: Cigarettes        Quit date: 4/3/1989        Years since quittin.6      Smokeless tobacco: Never Used    Vaping Use      Vaping Use: Never used    Substance and Sexual Activity      Alcohol use: No      Drug use: No      Sexual activity: None    Other Topics      Concerns:        None    Social History Narrative      None    Social Determinants of Health  Financial Resource Strain:       Difficulty of Paying Living Expenses: Not on file  Food Insecurity:       Worried About Running Out of Food in the Last Year: Not on file      Ran Out of Food in the Last Year: Not on file  Transportation Needs:       Lack of Transportation (Medical):  Not on file      Lack of Transportation (Non-Medical): Not on file  Physical Activity:       Days of Exercise per Week: Not on file      Minutes of Exercise per Session: Not on file  Stress:       Feeling of Stress : Not on file  Social Connections:       Frequency of Communication with Friends and Family: Not on file      Frequency of Social Gatherings with Friends and Family: Not on file      Attends Hoahaoism Services: Not on file      Active Member of [address] [place] [address] or Organizations: Not on file      Attends Club or Organization Meetings: Not on file      Marital Status: Not on file  Intimate Partner Violence:       Fear of Current or Ex-Partner: Not on file      Emotionally Abused: Not on file      Physically Abused: Not on file      Sexually Abused: Not on file  Housing Stability:       Unable to Pay for Housing in the Last Year: Not on file      Number of Places Lived in the Last Year: Not on file      Unstable Housing in the Last Year: Not on file    Current Outpatient Medications:  cephALEXin (KEFLEX) 500 MG capsule, TAKE 1 CAPSULE BY MOUTH FOUR TIMES A DAY, Disp: , Rfl: 0  albuterol sulfate  (90 Base) MCG/ACT inhaler, Inhale 2 puffs into the lungs every 6 hours as needed for Wheezing, Disp: , Rfl:   diclofenac (VOLTAREN) 75 MG EC tablet, Take 1 tablet by mouth 2 times daily 1 tablet by mouth twice a day, Disp: 60 tablet, Rfl: 2  Cetirizine HCl (ZYRTEC ALLERGY) 10 MG CAPS, Take by mouth daily as needed , Disp: , Rfl:   azelastine (OPTIVAR) 0.05 % ophthalmic solution, Place 1 drop into both eyes daily as needed , Disp: , Rfl:   Triamcinolone Acetonide (NASACORT ALLERGY 24HR) 55 MCG/ACT AERO, 2 sprays by Nasal route daily as needed , Disp: , Rfl:   ibuprofen (ADVIL;MOTRIN) 200 MG tablet, Take 200 mg by mouth every 6 hours as needed for Pain., Disp: , Rfl:     No current facility-administered medications for this visit.        -- Dye [Gadolinium Derivatives]     VITAL SIGNS:  Ht 6' 2\" (1.88 m)   Wt 240 lb (108.9 kg)   BMI 30.81 kg/m² Examination left knee shows no warmth, erythema, or effusion. After sterile prep injected left knee with 2 mL of Euflexxa for osteoarthritis. The patient tolerated this procedure well.

## 2021-11-12 NOTE — PROGRESS NOTES
Administrations This Visit     sodium hyaluronate (EUFLEXXA, HYALGAN) injection 20 mg     Admin Date  11/12/2021  10:56 Action  Given Dose  20 mg Route  Intra-artICUlar Site  Knee Left Administered By  Miguel Garcia    Ordering Provider: Patrick Paz MD    NDC: [address]-0    Lot#: [id number]    : Patsi Abbott    Patient Supplied?: No

## 2021-11-17 ENCOUNTER — OFFICE VISIT (OUTPATIENT)
Dept: ORTHOPEDIC SURGERY | Age: 66
End: 2021-11-17
Payer: COMMERCIAL

## 2021-11-17 VITALS — BODY MASS INDEX: 30.8 KG/M2 | HEIGHT: 74 IN | WEIGHT: 240 LBS

## 2021-11-17 DIAGNOSIS — M17.12 ARTHRITIS OF KNEE, LEFT: Primary | ICD-10-CM

## 2021-11-17 PROCEDURE — 20610 DRAIN/INJ JOINT/BURSA W/O US: CPT | Performed by: ORTHOPAEDIC SURGERY

## 2021-11-17 RX ORDER — HYALURONATE SODIUM 10 MG/ML
20 SYRINGE (ML) INTRAARTICULAR ONCE
Status: COMPLETED | OUTPATIENT
Start: 2021-11-17 | End: 2021-11-17

## 2021-11-17 RX ADMIN — Medication 20 MG: at 11:37

## 2021-11-17 NOTE — PROGRESS NOTES
The patient returns today for viscosupplementation of his left knee. He has had 1 Euflexxa injection and says it feels somewhat better. ROS: Pertinent items are noted in HPI. No notes on file    Past Medical History:  No date: Anemia      Comment:  CHRONIC, MILD  No date: Environmental allergies  No date: Hyperlipidemia  No date: LEMUEL (obstructive sleep apnea)     Past Surgical History:  No date: HERNIA REPAIR  No date: JOINT REPLACEMENT; Bilateral      Comment:  HIP  2018: KNEE ARTHROSCOPY;  Left      Comment:  partial lateral meniscectomy, loose body removal,                synovectomy  No date: NASAL SEPTUM SURGERY  2018: [place] [facility] KNE SURG W/MENISCECTOMY MED/LAT W/SHVG; Left      Comment:  LEFT KNEE ARTHROSCOPE, MENISCAL EXCISION/REPAIR                performed by Marcus Rowe MD at [address] [place] [address] of patient's family history indicates:  Problem: Heart Disease      Relation: Father          Age of Onset: (Not Specified)      Social History    Socioeconomic History      Marital status:       Spouse name: None      Number of children: None      Years of education: None      Highest education level: None    Occupational History      None    Tobacco Use      Smoking status: Former Smoker        Packs/day: 1.00        Years: 15.00        Pack years: 15        Types: Cigarettes        Quit date: 4/3/1989        Years since quittin.6      Smokeless tobacco: Never Used    Vaping Use      Vaping Use: Never used    Substance and Sexual Activity      Alcohol use: No      Drug use: No      Sexual activity: None    Other Topics      Concerns:        None    Social History Narrative      None    Social Determinants of Health  Financial Resource Strain:       Difficulty of Paying Living Expenses: Not on file  Food Insecurity:       Worried About Running Out of Food in the Last Year: Not on file      Ran Out of Food in the Last Year: Not on file  Transportation Needs:       Lack of Transportation (Medical): Not on file      Lack of Transportation (Non-Medical): Not on file  Physical Activity:       Days of Exercise per Week: Not on file      Minutes of Exercise per Session: Not on file  Stress:       Feeling of Stress : Not on file  Social Connections:       Frequency of Communication with Friends and Family: Not on file      Frequency of Social Gatherings with Friends and Family: Not on file      Attends Quaker Services: Not on file      Active Member of [address] [place] [address] or Organizations: Not on file      Attends Club or Organization Meetings: Not on file      Marital Status: Not on file  Intimate Partner Violence:       Fear of Current or Ex-Partner: Not on file      Emotionally Abused: Not on file      Physically Abused: Not on file      Sexually Abused: Not on file  Housing Stability:       Unable to Pay for Housing in the Last Year: Not on file      Number of Places Lived in the Last Year: Not on file      Unstable Housing in the Last Year: Not on file    Current Outpatient Medications:  cephALEXin (KEFLEX) 500 MG capsule, TAKE 1 CAPSULE BY MOUTH FOUR TIMES A DAY, Disp: , Rfl: 0  albuterol sulfate  (90 Base) MCG/ACT inhaler, Inhale 2 puffs into the lungs every 6 hours as needed for Wheezing, Disp: , Rfl:   diclofenac (VOLTAREN) 75 MG EC tablet, Take 1 tablet by mouth 2 times daily 1 tablet by mouth twice a day, Disp: 60 tablet, Rfl: 2  Cetirizine HCl (ZYRTEC ALLERGY) 10 MG CAPS, Take by mouth daily as needed , Disp: , Rfl:   azelastine (OPTIVAR) 0.05 % ophthalmic solution, Place 1 drop into both eyes daily as needed , Disp: , Rfl:   Triamcinolone Acetonide (NASACORT ALLERGY 24HR) 55 MCG/ACT AERO, 2 sprays by Nasal route daily as needed , Disp: , Rfl:   ibuprofen (ADVIL;MOTRIN) 200 MG tablet, Take 200 mg by mouth every 6 hours as needed for Pain., Disp: , Rfl:     No current facility-administered medications for this visit.        -- Dye [Gadolinium Derivatives]     VITAL SIGNS:  Ht 6' 2\" (1.88 m)   Wt 240 lb (108.9 kg)   BMI 30.81 kg/m²   Examination of the left knee today shows no warmth, erythema, or effusion. After sterile prep injected left knee with 2 mL of Euflexxa for osteoarthritis. The patient tolerated this procedure well.

## 2021-11-17 NOTE — PROGRESS NOTES
Administrations This Visit     sodium hyaluronate (EUFLEXXA, HYALGAN) injection 20 mg     Admin Date  11/17/2021  11:37 Action  Given Dose  20 mg Route  Intra-artICUlar Site  Knee Left Administered By  Kwaku Castro    Ordering Provider: Zhang Hardin MD    NDC: [address]-0    Lot#: [id number]    : Obdulia Kang    Patient Supplied?: No

## 2021-11-29 ENCOUNTER — OFFICE VISIT (OUTPATIENT)
Dept: ORTHOPEDIC SURGERY | Age: 66
End: 2021-11-29
Payer: COMMERCIAL

## 2021-11-29 VITALS — BODY MASS INDEX: 30.8 KG/M2 | HEIGHT: 74 IN | WEIGHT: 240 LBS

## 2021-11-29 DIAGNOSIS — M17.12 ARTHRITIS OF KNEE, LEFT: Primary | ICD-10-CM

## 2021-11-29 PROCEDURE — 20610 DRAIN/INJ JOINT/BURSA W/O US: CPT | Performed by: ORTHOPAEDIC SURGERY

## 2021-11-29 RX ORDER — HYALURONATE SODIUM 10 MG/ML
20 SYRINGE (ML) INTRAARTICULAR ONCE
Status: COMPLETED | OUTPATIENT
Start: 2021-11-29 | End: 2021-11-29

## 2021-11-29 RX ADMIN — Medication 20 MG: at 13:03

## 2021-11-29 NOTE — PROGRESS NOTES
The patient returns today for his third Euflexxa injection left knee. When asked estimate his amount of improvement he says about 50%. ROS: Pertinent items are noted in HPI. No notes on file    Past Medical History:  No date: Anemia      Comment:  CHRONIC, MILD  No date: Environmental allergies  No date: Hyperlipidemia  No date: LEMUEL (obstructive sleep apnea)     Past Surgical History:  No date: HERNIA REPAIR  No date: JOINT REPLACEMENT; Bilateral      Comment:  HIP  2018: KNEE ARTHROSCOPY;  Left      Comment:  partial lateral meniscectomy, loose body removal,                synovectomy  No date: NASAL SEPTUM SURGERY  2018: [place] [facility] KNE SURG W/MENISCECTOMY MED/LAT W/SHVG; Left      Comment:  LEFT KNEE ARTHROSCOPE, MENISCAL EXCISION/REPAIR                performed by Rolanda Lange MD at [address] [place] [address] of patient's family history indicates:  Problem: Heart Disease      Relation: Father          Age of Onset: (Not Specified)      Social History    Socioeconomic History      Marital status:       Spouse name: None      Number of children: None      Years of education: None      Highest education level: None    Occupational History      None    Tobacco Use      Smoking status: Former Smoker        Packs/day: 1.00        Years: 15.00        Pack years: 15        Types: Cigarettes        Quit date: 4/3/1989        Years since quittin.6      Smokeless tobacco: Never Used    Vaping Use      Vaping Use: Never used    Substance and Sexual Activity      Alcohol use: No      Drug use: No      Sexual activity: None    Other Topics      Concerns:        None    Social History Narrative      None    Social Determinants of Health  Financial Resource Strain:       Difficulty of Paying Living Expenses: Not on file  Food Insecurity:       Worried About Running Out of Food in the Last Year: Not on file      Ran Out of Food in the Last Year: Not on file  Transportation Needs:       Lack of Transportation (Medical): Not on file      Lack of Transportation (Non-Medical): Not on file  Physical Activity:       Days of Exercise per Week: Not on file      Minutes of Exercise per Session: Not on file  Stress:       Feeling of Stress : Not on file  Social Connections:       Frequency of Communication with Friends and Family: Not on file      Frequency of Social Gatherings with Friends and Family: Not on file      Attends Gnosticist Services: Not on file      Active Member of [employer] or Organizations: Not on file      Attends Club or Organization Meetings: Not on file      Marital Status: Not on file  Intimate Partner Violence:       Fear of Current or Ex-Partner: Not on file      Emotionally Abused: Not on file      Physically Abused: Not on file      Sexually Abused: Not on file  Housing Stability:       Unable to Pay for Housing in the Last Year: Not on file      Number of Places Lived in the Last Year: Not on file      Unstable Housing in the Last Year: Not on file    Current Outpatient Medications:  cephALEXin (KEFLEX) 500 MG capsule, TAKE 1 CAPSULE BY MOUTH FOUR TIMES A DAY, Disp: , Rfl: 0  albuterol sulfate  (90 Base) MCG/ACT inhaler, Inhale 2 puffs into the lungs every 6 hours as needed for Wheezing, Disp: , Rfl:   diclofenac (VOLTAREN) 75 MG EC tablet, Take 1 tablet by mouth 2 times daily 1 tablet by mouth twice a day, Disp: 60 tablet, Rfl: 2  Cetirizine HCl (ZYRTEC ALLERGY) 10 MG CAPS, Take by mouth daily as needed , Disp: , Rfl:   azelastine (OPTIVAR) 0.05 % ophthalmic solution, Place 1 drop into both eyes daily as needed , Disp: , Rfl:   Triamcinolone Acetonide (NASACORT ALLERGY 24HR) 55 MCG/ACT AERO, 2 sprays by Nasal route daily as needed , Disp: , Rfl:   ibuprofen (ADVIL;MOTRIN) 200 MG tablet, Take 200 mg by mouth every 6 hours as needed for Pain., Disp: , Rfl:     No current facility-administered medications for this visit.        -- Dye [Gadolinium Derivatives]     VITAL SIGNS:  Ht 6' 2\" (1.88 m)   Wt 240 lb (108.9 kg)   BMI 30.81 kg/m²   On examination left knee today has obvious valgus deformity and little by lateral joint line tenderness but no warmth erythema or effusion. He has full extension still. After sterile prep injected his left knee with 2 mL of Euflexxa for osteoarthritis. Patient tolerated this procedure well.

## 2021-11-29 NOTE — PROGRESS NOTES
Administrations This Visit     sodium hyaluronate (EUFLEXXA, HYALGAN) injection 20 mg     Admin Date  11/29/2021  13:03 Action  Given Dose  20 mg Route  Intra-artICUlar Site  Knee Left Administered By  Lm Truong    Ordering Provider: Grace Mendoza MD    NDC: [address]-2    Lot#: [id number]    : Minnie Batres    Patient Supplied?: No

## 2024-10-03 ENCOUNTER — TELEPHONE (OUTPATIENT)
Dept: ORTHOPEDIC SURGERY | Age: 69
End: 2024-10-03

## 2024-10-03 NOTE — TELEPHONE ENCOUNTER
Other PATIENT HAD HIP REPLACEMENT 11 YEARS AGO BY DR. ARRIOLA. PATIENT WANTS TO KNOW IF HE STILL NEEDS PRE MEDS WHEN HE GOES IN FOR HIS DENTIST APPT  [phone]

## 2025-04-14 ENCOUNTER — OFFICE VISIT (OUTPATIENT)
Dept: ORTHOPEDIC SURGERY | Age: 70
End: 2025-04-14

## 2025-04-14 VITALS — WEIGHT: 260 LBS | BODY MASS INDEX: 32.33 KG/M2 | HEIGHT: 75 IN

## 2025-04-14 DIAGNOSIS — M54.50 LUMBAR PAIN: ICD-10-CM

## 2025-04-14 DIAGNOSIS — M48.062 LUMBAR STENOSIS WITH NEUROGENIC CLAUDICATION: Primary | ICD-10-CM

## 2025-04-14 DIAGNOSIS — Z96.641 H/O TOTAL HIP ARTHROPLASTY, RIGHT: ICD-10-CM

## 2025-04-14 DIAGNOSIS — Z96.642 STATUS POST REVISION OF TOTAL REPLACEMENT OF LEFT HIP: ICD-10-CM

## 2025-04-14 PROCEDURE — 99024 POSTOP FOLLOW-UP VISIT: CPT | Performed by: STUDENT IN AN ORGANIZED HEALTH CARE EDUCATION/TRAINING PROGRAM

## 2025-04-14 RX ORDER — ZINC SULFATE 50(220)MG
220 CAPSULE ORAL DAILY
COMMUNITY

## 2025-04-14 RX ORDER — FEXOFENADINE HCL 180 MG/1
180 TABLET ORAL DAILY
COMMUNITY

## 2025-04-14 RX ORDER — ALBUTEROL SULFATE AND BUDESONIDE 90; 80 UG/1; UG/1
AEROSOL, METERED RESPIRATORY (INHALATION)
COMMUNITY

## 2025-04-14 RX ORDER — TIOTROPIUM BROMIDE 18 UG/1
18 CAPSULE ORAL; RESPIRATORY (INHALATION) DAILY
COMMUNITY

## 2025-04-14 RX ORDER — LEUPROLIDE ACETATE 22.5 MG
22.5 KIT SUBCUTANEOUS
COMMUNITY

## 2025-04-14 RX ORDER — DENOSUMAB 120 MG/1.7ML
120 INJECTION SUBCUTANEOUS ONCE
COMMUNITY

## 2025-04-14 RX ORDER — ROSUVASTATIN CALCIUM 10 MG/1
10 TABLET, COATED ORAL NIGHTLY
COMMUNITY

## 2025-04-14 RX ORDER — MAGNESIUM OXIDE 400 MG/1
400 TABLET ORAL DAILY
COMMUNITY

## 2025-04-14 RX ORDER — OXYBUTYNIN CHLORIDE 5 MG/1
5 TABLET ORAL 3 TIMES DAILY
COMMUNITY

## 2025-04-14 RX ORDER — ENZALUTAMIDE 80 MG/1
2 TABLET ORAL NIGHTLY
COMMUNITY

## 2025-04-14 RX ORDER — CELECOXIB 200 MG/1
200 CAPSULE ORAL 2 TIMES DAILY
Qty: 60 CAPSULE | Refills: 5 | Status: SHIPPED | OUTPATIENT
Start: 2025-04-14

## 2025-04-14 NOTE — PROGRESS NOTES
Dr Ivan Carrasco      Date /Time 2025       2:55 PM EDT  Name Nate Reddy             1955   Location  [facility] DR ORTHOPEDIC SURG  MRN [id number]                Chief Complaint   Patient presents with    Hip Pain     Bilateral  hip   H/o total hip   No recent imaging          History of Present Illness    Nate Reddy is a 69 y.o. male who presents for evaluation of intermittent bilateral hip pain which will radiate down the leg.  This is overall not limiting to him and his activities of daily living but this will occur sporadically and he is inquiring in terms of the cause of this.  He does mention that he has had bilateral hip replacements with a left hip revision remotely and wants to make sure it is not related to this      Past History  Past Medical History:   Diagnosis Date    Anemia     CHRONIC, MILD    Environmental allergies     Hyperlipidemia     LEMUEL (obstructive sleep apnea)      Past Surgical History:   Procedure Laterality Date    HERNIA REPAIR      JOINT REPLACEMENT Bilateral     HIP    KNEE ARTHROSCOPY Left 2018    partial lateral meniscectomy, loose body removal, synovectomy    NASAL SEPTUM SURGERY      [place] ARTH[facility] KNE SURG W/MENISCECTOMY MED/LAT W/SHVG Left 2018    LEFT KNEE ARTHROSCOPE, MENISCAL EXCISION/REPAIR performed by Bebeto Vinson MD at [facility] OR     Family History   Problem Relation Age of Onset    Heart Disease Father      Social History     Tobacco Use    Smoking status: Former     Current packs/day: 0.00     Average packs/day: 1 pack/day for 15.0 years (15.0 ttl pk-yrs)     Types: Cigarettes     Start date: 4/3/1974     Quit date: 4/3/1989     Years since quittin.0    Smokeless tobacco: Never   Substance Use Topics    Alcohol use: No      Current Outpatient Medications on File Prior to Visit   Medication Sig Dispense Refill    Albuterol-Budesonide (AIRSUPRA) 90-80 MCG/ACT AERO Inhale into the lungs      denosumab (XGEVA) 120 MG/1.7ML SOLN SC

## (undated) DEVICE — NEEDLE FLTR 19GA L1.5IN WALL THK5UM BRN POLYPR HUB S STL

## (undated) DEVICE — GOWN,PREVENTION PLUS,XLN/XL,ST,24/CS: Brand: MEDLINE

## (undated) DEVICE — DYONICS 25 PATIENT TUBE SET MUST                                    BE USED WITH 7211007, 12 PER BOX

## (undated) DEVICE — CHLORAPREP 26ML ORANGE

## (undated) DEVICE — Device

## (undated) DEVICE — 3 ML SYRINGE LUER-LOCK TIP: Brand: MONOJECT

## (undated) DEVICE — GLOVE ORANGE PI 8   MSG9080

## (undated) DEVICE — WEREWOLF FLOW 50 COBLATION WAND: Brand: COBLATION

## (undated) DEVICE — SUTURE MCRYL SZ 4-0 L27IN ABSRB UD L19MM PS-2 1/2 CIR PRIM Y426H

## (undated) DEVICE — 3M™ STERI-STRIP™ REINFORCED ADHESIVE SKIN CLOSURES, R1547, 1/2 IN X 4 IN (12 MM X 100 MM), 6 STRIPS/ENVELOPE: Brand: 3M™ STERI-STRIP™

## (undated) DEVICE — 3M™ IOBAN™ 2 ANTIMICROBIAL INCISE DRAPE 6650EZ: Brand: IOBAN™ 2

## (undated) DEVICE — Z INACTIVE USE 2660664 SOLUTION IRRIG 3000ML 0.9% SOD CHL USP UROMATIC PLAS CONT

## (undated) DEVICE — HYPODERMIC SAFETY NEEDLE: Brand: MAGELLAN

## (undated) DEVICE — 6 ML SYRINGE LUER-LOCK TIP: Brand: MONOJECT

## (undated) DEVICE — 4.5 MM FULL RADIUS ELITE STRAIGHT                                    DISPOSABLE BLADES, MAROON,PACKAGED 6                                    PER BOX, STERILE